# Patient Record
Sex: FEMALE | Race: WHITE | Employment: OTHER
[De-identification: names, ages, dates, MRNs, and addresses within clinical notes are randomized per-mention and may not be internally consistent; named-entity substitution may affect disease eponyms.]

---

## 2023-08-05 ENCOUNTER — HOSPITAL ENCOUNTER (EMERGENCY)
Facility: HOSPITAL | Age: 73
Discharge: HOME | DRG: 377 | End: 2023-08-06
Attending: EMERGENCY MEDICINE
Payer: MEDICARE

## 2023-08-05 DIAGNOSIS — I95.1 ORTHOSTATIC HYPOTENSION: ICD-10-CM

## 2023-08-05 DIAGNOSIS — R42 POSTURAL DIZZINESS WITH NEAR SYNCOPE: Primary | ICD-10-CM

## 2023-08-05 DIAGNOSIS — E86.0 DEHYDRATION: ICD-10-CM

## 2023-08-05 DIAGNOSIS — R55 POSTURAL DIZZINESS WITH NEAR SYNCOPE: Primary | ICD-10-CM

## 2023-08-05 LAB
ALBUMIN SERPL-MCNC: 3.2 G/DL (ref 3.5–5)
ALBUMIN/GLOB SERPL: 1.1 (ref 1.1–2.2)
ALP SERPL-CCNC: 40 U/L (ref 45–117)
ALT SERPL-CCNC: 15 U/L (ref 12–78)
ANION GAP SERPL CALC-SCNC: 9 MMOL/L (ref 5–15)
APPEARANCE UR: CLEAR
AST SERPL-CCNC: 11 U/L (ref 15–37)
BACTERIA URNS QL MICRO: NEGATIVE /HPF
BASOPHILS # BLD: 0 K/UL (ref 0–0.1)
BASOPHILS NFR BLD: 0 % (ref 0–1)
BILIRUB SERPL-MCNC: 0.3 MG/DL (ref 0.2–1)
BILIRUB UR QL: NEGATIVE
BUN SERPL-MCNC: 78 MG/DL (ref 6–20)
BUN/CREAT SERPL: 74 (ref 12–20)
CALCIUM SERPL-MCNC: 9.3 MG/DL (ref 8.5–10.1)
CHLORIDE SERPL-SCNC: 103 MMOL/L (ref 97–108)
CK SERPL-CCNC: 67 U/L (ref 26–192)
CO2 SERPL-SCNC: 27 MMOL/L (ref 21–32)
COLOR UR: ABNORMAL
CREAT SERPL-MCNC: 1.05 MG/DL (ref 0.55–1.02)
DIFFERENTIAL METHOD BLD: ABNORMAL
EOSINOPHIL # BLD: 0.1 K/UL (ref 0–0.4)
EOSINOPHIL NFR BLD: 1 % (ref 0–7)
EPITH CASTS URNS QL MICRO: ABNORMAL /LPF
ERYTHROCYTE [DISTWIDTH] IN BLOOD BY AUTOMATED COUNT: 12.9 % (ref 11.5–14.5)
GLOBULIN SER CALC-MCNC: 3 G/DL (ref 2–4)
GLUCOSE SERPL-MCNC: 130 MG/DL (ref 65–100)
GLUCOSE UR STRIP.AUTO-MCNC: NEGATIVE MG/DL
HCT VFR BLD AUTO: 31.2 % (ref 35–47)
HGB BLD-MCNC: 10.2 G/DL (ref 11.5–16)
HGB UR QL STRIP: ABNORMAL
IMM GRANULOCYTES # BLD AUTO: 0 K/UL (ref 0–0.04)
IMM GRANULOCYTES NFR BLD AUTO: 0 % (ref 0–0.5)
KETONES UR QL STRIP.AUTO: NEGATIVE MG/DL
LEUKOCYTE ESTERASE UR QL STRIP.AUTO: ABNORMAL
LYMPHOCYTES # BLD: 3.9 K/UL (ref 0.8–3.5)
LYMPHOCYTES NFR BLD: 42 % (ref 12–49)
MAGNESIUM SERPL-MCNC: 1.4 MG/DL (ref 1.6–2.4)
MCH RBC QN AUTO: 31.2 PG (ref 26–34)
MCHC RBC AUTO-ENTMCNC: 32.7 G/DL (ref 30–36.5)
MCV RBC AUTO: 95.4 FL (ref 80–99)
MONOCYTES # BLD: 0.5 K/UL (ref 0–1)
MONOCYTES NFR BLD: 5 % (ref 5–13)
NEUTS SEG # BLD: 4.7 K/UL (ref 1.8–8)
NEUTS SEG NFR BLD: 52 % (ref 32–75)
NITRITE UR QL STRIP.AUTO: NEGATIVE
NRBC # BLD: 0 K/UL (ref 0–0.01)
NRBC BLD-RTO: 0 PER 100 WBC
PH UR STRIP: 5 (ref 5–8)
PHOSPHATE SERPL-MCNC: 2.6 MG/DL (ref 2.6–4.7)
PLATELET # BLD AUTO: 340 K/UL (ref 150–400)
PMV BLD AUTO: 9.8 FL (ref 8.9–12.9)
POTASSIUM SERPL-SCNC: 4 MMOL/L (ref 3.5–5.1)
PROT SERPL-MCNC: 6.2 G/DL (ref 6.4–8.2)
PROT UR STRIP-MCNC: NEGATIVE MG/DL
RBC # BLD AUTO: 3.27 M/UL (ref 3.8–5.2)
RBC #/AREA URNS HPF: ABNORMAL /HPF (ref 0–5)
SODIUM SERPL-SCNC: 139 MMOL/L (ref 136–145)
SP GR UR REFRACTOMETRY: 1.01 (ref 1–1.03)
TROPONIN I SERPL HS-MCNC: 8 NG/L (ref 0–51)
TSH SERPL DL<=0.05 MIU/L-ACNC: 0.87 UIU/ML (ref 0.36–3.74)
URINE CULTURE IF INDICATED: ABNORMAL
UROBILINOGEN UR QL STRIP.AUTO: 0.2 EU/DL (ref 0.2–1)
WBC # BLD AUTO: 9.2 K/UL (ref 3.6–11)
WBC URNS QL MICRO: ABNORMAL /HPF (ref 0–4)

## 2023-08-05 PROCEDURE — 93005 ELECTROCARDIOGRAM TRACING: CPT | Performed by: EMERGENCY MEDICINE

## 2023-08-05 PROCEDURE — 84484 ASSAY OF TROPONIN QUANT: CPT

## 2023-08-05 PROCEDURE — 80053 COMPREHEN METABOLIC PANEL: CPT

## 2023-08-05 PROCEDURE — 2580000003 HC RX 258: Performed by: EMERGENCY MEDICINE

## 2023-08-05 PROCEDURE — 83735 ASSAY OF MAGNESIUM: CPT

## 2023-08-05 PROCEDURE — 84100 ASSAY OF PHOSPHORUS: CPT

## 2023-08-05 PROCEDURE — 6360000002 HC RX W HCPCS: Performed by: EMERGENCY MEDICINE

## 2023-08-05 PROCEDURE — 82550 ASSAY OF CK (CPK): CPT

## 2023-08-05 PROCEDURE — 81001 URINALYSIS AUTO W/SCOPE: CPT

## 2023-08-05 PROCEDURE — 36415 COLL VENOUS BLD VENIPUNCTURE: CPT

## 2023-08-05 PROCEDURE — 84443 ASSAY THYROID STIM HORMONE: CPT

## 2023-08-05 PROCEDURE — 85025 COMPLETE CBC W/AUTO DIFF WBC: CPT

## 2023-08-05 RX ORDER — DOXYCYCLINE HYCLATE 50 MG/1
324 CAPSULE, GELATIN COATED ORAL
Status: ON HOLD | COMMUNITY
End: 2023-08-12 | Stop reason: HOSPADM

## 2023-08-05 RX ORDER — ONDANSETRON 2 MG/ML
4 INJECTION INTRAMUSCULAR; INTRAVENOUS
Status: COMPLETED | OUTPATIENT
Start: 2023-08-05 | End: 2023-08-05

## 2023-08-05 RX ORDER — 0.9 % SODIUM CHLORIDE 0.9 %
1000 INTRAVENOUS SOLUTION INTRAVENOUS ONCE
Status: COMPLETED | OUTPATIENT
Start: 2023-08-05 | End: 2023-08-06

## 2023-08-05 RX ORDER — OLMESARTAN MEDOXOMIL AND HYDROCHLOROTHIAZIDE 20/12.5 20; 12.5 MG/1; MG/1
1 TABLET ORAL DAILY
COMMUNITY

## 2023-08-05 RX ORDER — 0.9 % SODIUM CHLORIDE 0.9 %
1000 INTRAVENOUS SOLUTION INTRAVENOUS ONCE
Status: COMPLETED | OUTPATIENT
Start: 2023-08-05 | End: 2023-08-05

## 2023-08-05 RX ADMIN — SODIUM CHLORIDE 1000 ML: 9 INJECTION, SOLUTION INTRAVENOUS at 22:27

## 2023-08-05 RX ADMIN — SODIUM CHLORIDE 1000 ML: 9 INJECTION, SOLUTION INTRAVENOUS at 20:08

## 2023-08-05 RX ADMIN — ONDANSETRON HYDROCHLORIDE 4 MG: 2 INJECTION, SOLUTION INTRAMUSCULAR; INTRAVENOUS at 20:52

## 2023-08-05 ASSESSMENT — PAIN - FUNCTIONAL ASSESSMENT: PAIN_FUNCTIONAL_ASSESSMENT: NONE - DENIES PAIN

## 2023-08-05 NOTE — ED TRIAGE NOTES
Pt complaining of dizziness after being in the sun yesterday and taking a laxative last night with good results.   Complaining of nausea without vomiting

## 2023-08-06 ENCOUNTER — HOSPITAL ENCOUNTER (INPATIENT)
Facility: HOSPITAL | Age: 73
LOS: 5 days | Discharge: HOME | DRG: 377 | End: 2023-08-12
Attending: STUDENT IN AN ORGANIZED HEALTH CARE EDUCATION/TRAINING PROGRAM | Admitting: STUDENT IN AN ORGANIZED HEALTH CARE EDUCATION/TRAINING PROGRAM
Payer: MEDICARE

## 2023-08-06 ENCOUNTER — APPOINTMENT (OUTPATIENT)
Facility: HOSPITAL | Age: 73
DRG: 377 | End: 2023-08-06
Payer: MEDICARE

## 2023-08-06 VITALS
BODY MASS INDEX: 18.24 KG/M2 | RESPIRATION RATE: 14 BRPM | SYSTOLIC BLOOD PRESSURE: 122 MMHG | HEART RATE: 79 BPM | DIASTOLIC BLOOD PRESSURE: 57 MMHG | TEMPERATURE: 98 F | OXYGEN SATURATION: 99 % | HEIGHT: 63 IN | WEIGHT: 102.95 LBS

## 2023-08-06 DIAGNOSIS — R55 SYNCOPE, UNSPECIFIED SYNCOPE TYPE: ICD-10-CM

## 2023-08-06 DIAGNOSIS — R42 DIZZINESS: ICD-10-CM

## 2023-08-06 DIAGNOSIS — N39.0 URINARY TRACT INFECTION WITHOUT HEMATURIA, SITE UNSPECIFIED: ICD-10-CM

## 2023-08-06 DIAGNOSIS — G93.89 BRAIN MASS: ICD-10-CM

## 2023-08-06 DIAGNOSIS — R53.83 OTHER FATIGUE: ICD-10-CM

## 2023-08-06 DIAGNOSIS — E83.42 HYPOMAGNESEMIA: ICD-10-CM

## 2023-08-06 DIAGNOSIS — D64.9 ANEMIA, UNSPECIFIED TYPE: Primary | ICD-10-CM

## 2023-08-06 LAB
ALBUMIN SERPL-MCNC: 2.9 G/DL (ref 3.5–5)
ALBUMIN/GLOB SERPL: 1.1 (ref 1.1–2.2)
ALP SERPL-CCNC: 36 U/L (ref 45–117)
ALT SERPL-CCNC: 18 U/L (ref 12–78)
ANION GAP SERPL CALC-SCNC: 5 MMOL/L (ref 5–15)
APPEARANCE UR: CLEAR
AST SERPL-CCNC: 13 U/L (ref 15–37)
BACTERIA URNS QL MICRO: NEGATIVE /HPF
BASOPHILS # BLD: 0 K/UL (ref 0–0.1)
BASOPHILS NFR BLD: 0 % (ref 0–1)
BILIRUB SERPL-MCNC: 0.2 MG/DL (ref 0.2–1)
BILIRUB UR QL: NEGATIVE
BUN SERPL-MCNC: 69 MG/DL (ref 6–20)
BUN/CREAT SERPL: 69 (ref 12–20)
CALCIUM SERPL-MCNC: 9 MG/DL (ref 8.5–10.1)
CHLORIDE SERPL-SCNC: 112 MMOL/L (ref 97–108)
CO2 SERPL-SCNC: 23 MMOL/L (ref 21–32)
COLOR UR: ABNORMAL
COMMENT:: NORMAL
CREAT SERPL-MCNC: 1 MG/DL (ref 0.55–1.02)
DIFFERENTIAL METHOD BLD: ABNORMAL
EKG ATRIAL RATE: 84 BPM
EKG DIAGNOSIS: NORMAL
EKG P AXIS: 64 DEGREES
EKG P-R INTERVAL: 170 MS
EKG Q-T INTERVAL: 402 MS
EKG QRS DURATION: 116 MS
EKG QTC CALCULATION (BAZETT): 475 MS
EKG R AXIS: 49 DEGREES
EKG T AXIS: 39 DEGREES
EKG VENTRICULAR RATE: 84 BPM
EOSINOPHIL # BLD: 0 K/UL (ref 0–0.4)
EOSINOPHIL NFR BLD: 0 % (ref 0–7)
EPITH CASTS URNS QL MICRO: ABNORMAL /LPF
ERYTHROCYTE [DISTWIDTH] IN BLOOD BY AUTOMATED COUNT: 13.1 % (ref 11.5–14.5)
GLOBULIN SER CALC-MCNC: 2.6 G/DL (ref 2–4)
GLUCOSE SERPL-MCNC: 109 MG/DL (ref 65–100)
GLUCOSE UR STRIP.AUTO-MCNC: NEGATIVE MG/DL
HCT VFR BLD AUTO: 22.5 % (ref 35–47)
HGB BLD-MCNC: 7.2 G/DL (ref 11.5–16)
HGB UR QL STRIP: NEGATIVE
HYALINE CASTS URNS QL MICRO: ABNORMAL /LPF (ref 0–5)
IMM GRANULOCYTES # BLD AUTO: 0 K/UL (ref 0–0.04)
IMM GRANULOCYTES NFR BLD AUTO: 0 % (ref 0–0.5)
KETONES UR QL STRIP.AUTO: NEGATIVE MG/DL
LEUKOCYTE ESTERASE UR QL STRIP.AUTO: ABNORMAL
LYMPHOCYTES # BLD: 2 K/UL (ref 0.8–3.5)
LYMPHOCYTES NFR BLD: 21 % (ref 12–49)
MAGNESIUM SERPL-MCNC: 1.5 MG/DL (ref 1.6–2.4)
MCH RBC QN AUTO: 31.7 PG (ref 26–34)
MCHC RBC AUTO-ENTMCNC: 32 G/DL (ref 30–36.5)
MCV RBC AUTO: 99.1 FL (ref 80–99)
MONOCYTES # BLD: 0.5 K/UL (ref 0–1)
MONOCYTES NFR BLD: 5 % (ref 5–13)
NEUTS SEG # BLD: 7.3 K/UL (ref 1.8–8)
NEUTS SEG NFR BLD: 74 % (ref 32–75)
NITRITE UR QL STRIP.AUTO: NEGATIVE
NRBC # BLD: 0 K/UL (ref 0–0.01)
NRBC BLD-RTO: 0 PER 100 WBC
PH UR STRIP: 5 (ref 5–8)
PLATELET # BLD AUTO: 270 K/UL (ref 150–400)
PMV BLD AUTO: 10.3 FL (ref 8.9–12.9)
POTASSIUM SERPL-SCNC: 4.2 MMOL/L (ref 3.5–5.1)
PROT SERPL-MCNC: 5.5 G/DL (ref 6.4–8.2)
PROT UR STRIP-MCNC: NEGATIVE MG/DL
RBC # BLD AUTO: 2.27 M/UL (ref 3.8–5.2)
RBC #/AREA URNS HPF: ABNORMAL /HPF (ref 0–5)
SODIUM SERPL-SCNC: 140 MMOL/L (ref 136–145)
SP GR UR REFRACTOMETRY: 1.01 (ref 1–1.03)
SPECIMEN HOLD: NORMAL
TROPONIN I SERPL HS-MCNC: 6 NG/L (ref 0–51)
TSH SERPL DL<=0.05 MIU/L-ACNC: 1.15 UIU/ML (ref 0.36–3.74)
UROBILINOGEN UR QL STRIP.AUTO: 0.2 EU/DL (ref 0.2–1)
WBC # BLD AUTO: 9.9 K/UL (ref 3.6–11)
WBC URNS QL MICRO: ABNORMAL /HPF (ref 0–4)

## 2023-08-06 PROCEDURE — 81001 URINALYSIS AUTO W/SCOPE: CPT

## 2023-08-06 PROCEDURE — 70450 CT HEAD/BRAIN W/O DYE: CPT

## 2023-08-06 PROCEDURE — 96361 HYDRATE IV INFUSION ADD-ON: CPT

## 2023-08-06 PROCEDURE — 6370000000 HC RX 637 (ALT 250 FOR IP): Performed by: STUDENT IN AN ORGANIZED HEALTH CARE EDUCATION/TRAINING PROGRAM

## 2023-08-06 PROCEDURE — 93005 ELECTROCARDIOGRAM TRACING: CPT | Performed by: HOSPITALIST

## 2023-08-06 PROCEDURE — 80053 COMPREHEN METABOLIC PANEL: CPT

## 2023-08-06 PROCEDURE — 84443 ASSAY THYROID STIM HORMONE: CPT

## 2023-08-06 PROCEDURE — 83735 ASSAY OF MAGNESIUM: CPT

## 2023-08-06 PROCEDURE — 85025 COMPLETE CBC W/AUTO DIFF WBC: CPT

## 2023-08-06 PROCEDURE — 36415 COLL VENOUS BLD VENIPUNCTURE: CPT

## 2023-08-06 PROCEDURE — 71045 X-RAY EXAM CHEST 1 VIEW: CPT

## 2023-08-06 PROCEDURE — 84484 ASSAY OF TROPONIN QUANT: CPT

## 2023-08-06 PROCEDURE — 2580000003 HC RX 258: Performed by: STUDENT IN AN ORGANIZED HEALTH CARE EDUCATION/TRAINING PROGRAM

## 2023-08-06 PROCEDURE — 99285 EMERGENCY DEPT VISIT HI MDM: CPT

## 2023-08-06 RX ORDER — 0.9 % SODIUM CHLORIDE 0.9 %
1000 INTRAVENOUS SOLUTION INTRAVENOUS ONCE
Status: COMPLETED | OUTPATIENT
Start: 2023-08-06 | End: 2023-08-07

## 2023-08-06 RX ORDER — ACETAMINOPHEN 500 MG
1000 TABLET ORAL
Status: COMPLETED | OUTPATIENT
Start: 2023-08-06 | End: 2023-08-06

## 2023-08-06 RX ADMIN — ACETAMINOPHEN 1000 MG: 500 TABLET ORAL at 23:44

## 2023-08-06 RX ADMIN — SODIUM CHLORIDE 1000 ML: 9 INJECTION, SOLUTION INTRAVENOUS at 22:09

## 2023-08-06 ASSESSMENT — LIFESTYLE VARIABLES
HOW OFTEN DO YOU HAVE A DRINK CONTAINING ALCOHOL: NEVER
HOW MANY STANDARD DRINKS CONTAINING ALCOHOL DO YOU HAVE ON A TYPICAL DAY: PATIENT DOES NOT DRINK

## 2023-08-06 ASSESSMENT — PAIN - FUNCTIONAL ASSESSMENT: PAIN_FUNCTIONAL_ASSESSMENT: NONE - DENIES PAIN

## 2023-08-06 ASSESSMENT — PAIN SCALES - GENERAL: PAINLEVEL_OUTOF10: 6

## 2023-08-06 NOTE — ED NOTES
Patient ambulated in hallway, stated felling better, advised MD that she would like to go home.      Delmis Bustamante RN  08/06/23 5841

## 2023-08-06 NOTE — ED PROVIDER NOTES
[facility]  EMERGENCY DEPARTMENT ENCOUNTER      Pt Name: Iliana Damon  MRN: [id number]  [address] 1950  Date of evaluation: 8/5/2023  Provider: Darling Marx MD    [address]       Chief Complaint   Patient presents with    Dizziness         HISTORY OF PRESENT ILLNESS   (Location/Symptom, Timing/Onset, Context/Setting, Quality, Duration, Modifying Factors, Severity)  Note limiting factors. 75-year-old female with a past medical history significant for diabetes, hypertension, hypercholesterolemia, chronic kidney disease who presents to the ER accompanied by family at the bedside with a complaint of dizziness, lightheadedness, feeling faint as if she is going to pass out accompanied by nausea. The patient states that she laid out in the sun yesterday for approximately 2 to 3 hours and then went home and symptoms began in the evening. The patient also allegedly took a laxative and has had several bouts of diarrhea. She tried to hydrate herself without any improvement of symptom and discomfort. She denies any fever chills, cough or congestion, chest pain, shortness of breath, vomiting, abdominal pain, extremity weakness or numbness, sick contact, skin rash or recent travel. The patient does state that she had a similar episode and [facility] where she had some fluid and felt better. Review of External Medical Records:     Nursing Notes were reviewed. REVIEW OF SYSTEMS    (2-9 systems for level 4, 10 or more for level 5)     Review of Systems   All other systems reviewed and are negative. Except as noted above the remainder of the review of systems was reviewed and negative. PAST MEDICAL HISTORY     Past Medical History:   Diagnosis Date    Chronic kidney disease     Diabetes ([address])     Hypertension          SURGICAL HISTORY     No past surgical history on file.       CURRENT MEDICATIONS       Discharge Medication List as of 8/6/2023 12:13

## 2023-08-06 NOTE — ED NOTES
PIV removed, slight infiltration appreciated,  D/C per orders, ambulated to car with assistance, family to drive patient home.      Delmis Bustamante RN  08/06/23 9049

## 2023-08-07 ENCOUNTER — APPOINTMENT (OUTPATIENT)
Facility: HOSPITAL | Age: 73
DRG: 377 | End: 2023-08-07
Payer: MEDICARE

## 2023-08-07 PROBLEM — D64.9 SYMPTOMATIC ANEMIA: Status: ACTIVE | Noted: 2023-08-07

## 2023-08-07 LAB
ALBUMIN SERPL-MCNC: 2.6 G/DL (ref 3.5–5)
ALBUMIN/GLOB SERPL: 1.3 (ref 1.1–2.2)
ALP SERPL-CCNC: 29 U/L (ref 45–117)
ALT SERPL-CCNC: 13 U/L (ref 12–78)
ANION GAP SERPL CALC-SCNC: 5 MMOL/L (ref 5–15)
ANION GAP SERPL CALC-SCNC: 6 MMOL/L (ref 5–15)
AST SERPL-CCNC: 13 U/L (ref 15–37)
BASOPHILS # BLD: 0 K/UL (ref 0–0.1)
BASOPHILS # BLD: 0 K/UL (ref 0–0.1)
BASOPHILS # BLD: 0.1 K/UL (ref 0–0.1)
BASOPHILS NFR BLD: 0 % (ref 0–1)
BASOPHILS NFR BLD: 0 % (ref 0–1)
BASOPHILS NFR BLD: 1 % (ref 0–1)
BILIRUB SERPL-MCNC: 0.4 MG/DL (ref 0.2–1)
BUN SERPL-MCNC: 60 MG/DL (ref 6–20)
BUN SERPL-MCNC: 61 MG/DL (ref 6–20)
BUN/CREAT SERPL: 70 (ref 12–20)
BUN/CREAT SERPL: 76 (ref 12–20)
CALCIUM SERPL-MCNC: 7.3 MG/DL (ref 8.5–10.1)
CALCIUM SERPL-MCNC: 7.9 MG/DL (ref 8.5–10.1)
CHLORIDE SERPL-SCNC: 113 MMOL/L (ref 97–108)
CHLORIDE SERPL-SCNC: 118 MMOL/L (ref 97–108)
CO2 SERPL-SCNC: 22 MMOL/L (ref 21–32)
CO2 SERPL-SCNC: 23 MMOL/L (ref 21–32)
COMMENT:: NORMAL
CREAT SERPL-MCNC: 0.8 MG/DL (ref 0.55–1.02)
CREAT SERPL-MCNC: 0.86 MG/DL (ref 0.55–1.02)
D DIMER PPP FEU-MCNC: <0.19 MG/L FEU (ref 0–0.65)
DIFFERENTIAL METHOD BLD: ABNORMAL
EKG ATRIAL RATE: 80 BPM
EKG DIAGNOSIS: NORMAL
EKG P AXIS: 85 DEGREES
EKG P-R INTERVAL: 174 MS
EKG Q-T INTERVAL: 416 MS
EKG QRS DURATION: 122 MS
EKG QTC CALCULATION (BAZETT): 479 MS
EKG R AXIS: 38 DEGREES
EKG T AXIS: 62 DEGREES
EKG VENTRICULAR RATE: 80 BPM
EOSINOPHIL # BLD: 0 K/UL (ref 0–0.4)
EOSINOPHIL # BLD: 0 K/UL (ref 0–0.4)
EOSINOPHIL # BLD: 0.1 K/UL (ref 0–0.4)
EOSINOPHIL NFR BLD: 0 % (ref 0–7)
EOSINOPHIL NFR BLD: 0 % (ref 0–7)
EOSINOPHIL NFR BLD: 1 % (ref 0–7)
ERYTHROCYTE [DISTWIDTH] IN BLOOD BY AUTOMATED COUNT: 12.9 % (ref 11.5–14.5)
ERYTHROCYTE [DISTWIDTH] IN BLOOD BY AUTOMATED COUNT: 14.6 % (ref 11.5–14.5)
ERYTHROCYTE [DISTWIDTH] IN BLOOD BY AUTOMATED COUNT: 15 % (ref 11.5–14.5)
EST. AVERAGE GLUCOSE BLD GHB EST-MCNC: ABNORMAL MG/DL
FERRITIN SERPL-MCNC: 27 NG/ML (ref 8–252)
FOLATE SERPL-MCNC: 18.9 NG/ML (ref 5–21)
GLOBULIN SER CALC-MCNC: 2 G/DL (ref 2–4)
GLUCOSE BLD STRIP.AUTO-MCNC: 121 MG/DL (ref 65–117)
GLUCOSE BLD STRIP.AUTO-MCNC: 121 MG/DL (ref 65–117)
GLUCOSE BLD STRIP.AUTO-MCNC: 133 MG/DL (ref 65–117)
GLUCOSE BLD STRIP.AUTO-MCNC: 237 MG/DL (ref 65–117)
GLUCOSE BLD STRIP.AUTO-MCNC: 95 MG/DL (ref 65–117)
GLUCOSE SERPL-MCNC: 123 MG/DL (ref 65–100)
GLUCOSE SERPL-MCNC: 208 MG/DL (ref 65–100)
HAPTOGLOB SERPL-MCNC: 119 MG/DL (ref 30–200)
HBA1C MFR BLD: <3.8 % (ref 4–5.6)
HCT VFR BLD AUTO: 18.1 % (ref 35–47)
HCT VFR BLD AUTO: 18.7 % (ref 35–47)
HCT VFR BLD AUTO: 26.5 % (ref 35–47)
HEMOCCULT STL QL: POSITIVE
HGB BLD-MCNC: 5.6 G/DL (ref 11.5–16)
HGB BLD-MCNC: 5.9 G/DL (ref 11.5–16)
HGB BLD-MCNC: 8.4 G/DL (ref 11.5–16)
HISTORY CHECK: NORMAL
HISTORY CHECK: NORMAL
IMM GRANULOCYTES # BLD AUTO: 0 K/UL (ref 0–0.04)
IMM GRANULOCYTES # BLD AUTO: 0.1 K/UL (ref 0–0.04)
IMM GRANULOCYTES # BLD AUTO: 0.1 K/UL (ref 0–0.04)
IMM GRANULOCYTES NFR BLD AUTO: 0 % (ref 0–0.5)
IMM GRANULOCYTES NFR BLD AUTO: 1 % (ref 0–0.5)
IMM GRANULOCYTES NFR BLD AUTO: 1 % (ref 0–0.5)
IRON SATN MFR SERPL: 37 % (ref 20–50)
IRON SERPL-MCNC: 91 UG/DL (ref 35–150)
LDH SERPL L TO P-CCNC: 92 U/L (ref 81–246)
LYMPHOCYTES # BLD: 1.8 K/UL (ref 0.8–3.5)
LYMPHOCYTES # BLD: 2.2 K/UL (ref 0.8–3.5)
LYMPHOCYTES # BLD: 3.2 K/UL (ref 0.8–3.5)
LYMPHOCYTES NFR BLD: 14 % (ref 12–49)
LYMPHOCYTES NFR BLD: 25 % (ref 12–49)
LYMPHOCYTES NFR BLD: 33 % (ref 12–49)
MCH RBC QN AUTO: 30.4 PG (ref 26–34)
MCH RBC QN AUTO: 30.4 PG (ref 26–34)
MCH RBC QN AUTO: 31.1 PG (ref 26–34)
MCHC RBC AUTO-ENTMCNC: 30.9 G/DL (ref 30–36.5)
MCHC RBC AUTO-ENTMCNC: 31.6 G/DL (ref 30–36.5)
MCHC RBC AUTO-ENTMCNC: 31.7 G/DL (ref 30–36.5)
MCV RBC AUTO: 100.6 FL (ref 80–99)
MCV RBC AUTO: 96 FL (ref 80–99)
MCV RBC AUTO: 96.4 FL (ref 80–99)
MONOCYTES # BLD: 0.5 K/UL (ref 0–1)
MONOCYTES # BLD: 0.5 K/UL (ref 0–1)
MONOCYTES # BLD: 0.6 K/UL (ref 0–1)
MONOCYTES NFR BLD: 5 % (ref 5–13)
MONOCYTES NFR BLD: 5 % (ref 5–13)
MONOCYTES NFR BLD: 6 % (ref 5–13)
NEUTS SEG # BLD: 5.7 K/UL (ref 1.8–8)
NEUTS SEG # BLD: 5.8 K/UL (ref 1.8–8)
NEUTS SEG # BLD: 9.8 K/UL (ref 1.8–8)
NEUTS SEG NFR BLD: 60 % (ref 32–75)
NEUTS SEG NFR BLD: 68 % (ref 32–75)
NEUTS SEG NFR BLD: 80 % (ref 32–75)
NRBC # BLD: 0 K/UL (ref 0–0.01)
NRBC BLD-RTO: 0 PER 100 WBC
PERIPHERAL SMEAR, MD REVIEW: NORMAL
PLATELET # BLD AUTO: 168 K/UL (ref 150–400)
PLATELET # BLD AUTO: 191 K/UL (ref 150–400)
PLATELET # BLD AUTO: 220 K/UL (ref 150–400)
PMV BLD AUTO: 10.2 FL (ref 8.9–12.9)
PMV BLD AUTO: 10.3 FL (ref 8.9–12.9)
PMV BLD AUTO: 10.6 FL (ref 8.9–12.9)
POTASSIUM SERPL-SCNC: 4.1 MMOL/L (ref 3.5–5.1)
POTASSIUM SERPL-SCNC: 4.4 MMOL/L (ref 3.5–5.1)
PROT SERPL-MCNC: 4.6 G/DL (ref 6.4–8.2)
RBC # BLD AUTO: 1.8 M/UL (ref 3.8–5.2)
RBC # BLD AUTO: 1.94 M/UL (ref 3.8–5.2)
RBC # BLD AUTO: 2.76 M/UL (ref 3.8–5.2)
RBC MORPH BLD: ABNORMAL
RBC MORPH BLD: ABNORMAL
RETICS # AUTO: 0.05 M/UL (ref 0.02–0.08)
RETICS/RBC NFR AUTO: 2.8 % (ref 0.7–2.1)
SERVICE CMNT-IMP: ABNORMAL
SERVICE CMNT-IMP: NORMAL
SODIUM SERPL-SCNC: 142 MMOL/L (ref 136–145)
SODIUM SERPL-SCNC: 145 MMOL/L (ref 136–145)
SPECIMEN HOLD: NORMAL
TIBC SERPL-MCNC: 243 UG/DL (ref 250–450)
TROPONIN I SERPL HS-MCNC: 7 NG/L (ref 0–51)
TROPONIN I SERPL HS-MCNC: 8 NG/L (ref 0–51)
VIT B12 SERPL-MCNC: 1717 PG/ML (ref 193–986)
WBC # BLD AUTO: 12.3 K/UL (ref 3.6–11)
WBC # BLD AUTO: 8.6 K/UL (ref 3.6–11)
WBC # BLD AUTO: 9.6 K/UL (ref 3.6–11)

## 2023-08-07 PROCEDURE — 85045 AUTOMATED RETICULOCYTE COUNT: CPT

## 2023-08-07 PROCEDURE — 96365 THER/PROPH/DIAG IV INF INIT: CPT

## 2023-08-07 PROCEDURE — 82272 OCCULT BLD FECES 1-3 TESTS: CPT

## 2023-08-07 PROCEDURE — 80053 COMPREHEN METABOLIC PANEL: CPT

## 2023-08-07 PROCEDURE — 2580000003 HC RX 258

## 2023-08-07 PROCEDURE — 94760 N-INVAS EAR/PLS OXIMETRY 1: CPT

## 2023-08-07 PROCEDURE — 97110 THERAPEUTIC EXERCISES: CPT

## 2023-08-07 PROCEDURE — A4216 STERILE WATER/SALINE, 10 ML: HCPCS

## 2023-08-07 PROCEDURE — 6370000000 HC RX 637 (ALT 250 FOR IP): Performed by: STUDENT IN AN ORGANIZED HEALTH CARE EDUCATION/TRAINING PROGRAM

## 2023-08-07 PROCEDURE — 2060000000 HC ICU INTERMEDIATE R&B

## 2023-08-07 PROCEDURE — 85025 COMPLETE CBC W/AUTO DIFF WBC: CPT

## 2023-08-07 PROCEDURE — 6360000004 HC RX CONTRAST MEDICATION: Performed by: STUDENT IN AN ORGANIZED HEALTH CARE EDUCATION/TRAINING PROGRAM

## 2023-08-07 PROCEDURE — 83615 LACTATE (LD) (LDH) ENZYME: CPT

## 2023-08-07 PROCEDURE — 83010 ASSAY OF HAPTOGLOBIN QUANT: CPT

## 2023-08-07 PROCEDURE — 86850 RBC ANTIBODY SCREEN: CPT

## 2023-08-07 PROCEDURE — 86900 BLOOD TYPING SEROLOGIC ABO: CPT

## 2023-08-07 PROCEDURE — 82607 VITAMIN B-12: CPT

## 2023-08-07 PROCEDURE — 6360000002 HC RX W HCPCS

## 2023-08-07 PROCEDURE — 83550 IRON BINDING TEST: CPT

## 2023-08-07 PROCEDURE — 36415 COLL VENOUS BLD VENIPUNCTURE: CPT

## 2023-08-07 PROCEDURE — 30233N1 TRANSFUSION OF NONAUTOLOGOUS RED BLOOD CELLS INTO PERIPHERAL VEIN, PERCUTANEOUS APPROACH: ICD-10-PCS | Performed by: INTERNAL MEDICINE

## 2023-08-07 PROCEDURE — 93010 ELECTROCARDIOGRAM REPORT: CPT | Performed by: SPECIALIST

## 2023-08-07 PROCEDURE — 6370000000 HC RX 637 (ALT 250 FOR IP): Performed by: HOSPITALIST

## 2023-08-07 PROCEDURE — 2580000003 HC RX 258: Performed by: HOSPITALIST

## 2023-08-07 PROCEDURE — 82746 ASSAY OF FOLIC ACID SERUM: CPT

## 2023-08-07 PROCEDURE — 97165 OT EVAL LOW COMPLEX 30 MIN: CPT

## 2023-08-07 PROCEDURE — 84484 ASSAY OF TROPONIN QUANT: CPT

## 2023-08-07 PROCEDURE — 86901 BLOOD TYPING SEROLOGIC RH(D): CPT

## 2023-08-07 PROCEDURE — 97161 PT EVAL LOW COMPLEX 20 MIN: CPT

## 2023-08-07 PROCEDURE — 85379 FIBRIN DEGRADATION QUANT: CPT

## 2023-08-07 PROCEDURE — 6360000002 HC RX W HCPCS: Performed by: STUDENT IN AN ORGANIZED HEALTH CARE EDUCATION/TRAINING PROGRAM

## 2023-08-07 PROCEDURE — P9016 RBC LEUKOCYTES REDUCED: HCPCS

## 2023-08-07 PROCEDURE — 82728 ASSAY OF FERRITIN: CPT

## 2023-08-07 PROCEDURE — 82962 GLUCOSE BLOOD TEST: CPT

## 2023-08-07 PROCEDURE — 83036 HEMOGLOBIN GLYCOSYLATED A1C: CPT

## 2023-08-07 PROCEDURE — 86923 COMPATIBILITY TEST ELECTRIC: CPT

## 2023-08-07 PROCEDURE — 2580000003 HC RX 258: Performed by: STUDENT IN AN ORGANIZED HEALTH CARE EDUCATION/TRAINING PROGRAM

## 2023-08-07 PROCEDURE — 97530 THERAPEUTIC ACTIVITIES: CPT

## 2023-08-07 PROCEDURE — 97535 SELF CARE MNGMENT TRAINING: CPT

## 2023-08-07 PROCEDURE — 74178 CT ABD&PLV WO CNTR FLWD CNTR: CPT

## 2023-08-07 PROCEDURE — 83540 ASSAY OF IRON: CPT

## 2023-08-07 PROCEDURE — C9113 INJ PANTOPRAZOLE SODIUM, VIA: HCPCS

## 2023-08-07 RX ORDER — SODIUM CHLORIDE 0.9 % (FLUSH) 0.9 %
5-40 SYRINGE (ML) INJECTION PRN
Status: DISCONTINUED | OUTPATIENT
Start: 2023-08-07 | End: 2023-08-12 | Stop reason: HOSPADM

## 2023-08-07 RX ORDER — ACETAMINOPHEN 650 MG/1
650 SUPPOSITORY RECTAL EVERY 6 HOURS PRN
Status: DISCONTINUED | OUTPATIENT
Start: 2023-08-07 | End: 2023-08-12 | Stop reason: HOSPADM

## 2023-08-07 RX ORDER — SODIUM CHLORIDE 9 MG/ML
INJECTION, SOLUTION INTRAVENOUS PRN
Status: DISCONTINUED | OUTPATIENT
Start: 2023-08-07 | End: 2023-08-10 | Stop reason: ALTCHOICE

## 2023-08-07 RX ORDER — POLYETHYLENE GLYCOL 3350 17 G/17G
17 POWDER, FOR SOLUTION ORAL DAILY PRN
Status: DISCONTINUED | OUTPATIENT
Start: 2023-08-07 | End: 2023-08-12 | Stop reason: HOSPADM

## 2023-08-07 RX ORDER — INSULIN LISPRO 100 [IU]/ML
0-4 INJECTION, SOLUTION INTRAVENOUS; SUBCUTANEOUS
Status: DISCONTINUED | OUTPATIENT
Start: 2023-08-07 | End: 2023-08-12 | Stop reason: HOSPADM

## 2023-08-07 RX ORDER — 0.9 % SODIUM CHLORIDE 0.9 %
1000 INTRAVENOUS SOLUTION INTRAVENOUS ONCE
Status: COMPLETED | OUTPATIENT
Start: 2023-08-07 | End: 2023-08-07

## 2023-08-07 RX ORDER — SODIUM CHLORIDE 9 MG/ML
INJECTION, SOLUTION INTRAVENOUS CONTINUOUS
Status: DISCONTINUED | OUTPATIENT
Start: 2023-08-07 | End: 2023-08-07

## 2023-08-07 RX ORDER — ONDANSETRON 4 MG/1
4 TABLET, ORALLY DISINTEGRATING ORAL EVERY 8 HOURS PRN
Status: DISCONTINUED | OUTPATIENT
Start: 2023-08-07 | End: 2023-08-12 | Stop reason: HOSPADM

## 2023-08-07 RX ORDER — SODIUM CHLORIDE 9 MG/ML
INJECTION, SOLUTION INTRAVENOUS PRN
Status: DISCONTINUED | OUTPATIENT
Start: 2023-08-07 | End: 2023-08-12 | Stop reason: HOSPADM

## 2023-08-07 RX ORDER — SODIUM CHLORIDE 9 MG/ML
INJECTION, SOLUTION INTRAVENOUS CONTINUOUS
Status: DISCONTINUED | OUTPATIENT
Start: 2023-08-07 | End: 2023-08-08

## 2023-08-07 RX ORDER — LIDOCAINE 40 MG/G
CREAM TOPICAL PRN
Status: DISCONTINUED | OUTPATIENT
Start: 2023-08-07 | End: 2023-08-12 | Stop reason: HOSPADM

## 2023-08-07 RX ORDER — SODIUM CHLORIDE 0.9 % (FLUSH) 0.9 %
5-40 SYRINGE (ML) INJECTION EVERY 12 HOURS SCHEDULED
Status: DISCONTINUED | OUTPATIENT
Start: 2023-08-07 | End: 2023-08-12 | Stop reason: HOSPADM

## 2023-08-07 RX ORDER — ACETAMINOPHEN 325 MG/1
650 TABLET ORAL EVERY 6 HOURS PRN
Status: DISCONTINUED | OUTPATIENT
Start: 2023-08-07 | End: 2023-08-12 | Stop reason: HOSPADM

## 2023-08-07 RX ORDER — CALCIUM CARBONATE 500 MG/1
1 TABLET, CHEWABLE ORAL
Status: COMPLETED | OUTPATIENT
Start: 2023-08-07 | End: 2023-08-07

## 2023-08-07 RX ORDER — NITROFURANTOIN 25; 75 MG/1; MG/1
100 CAPSULE ORAL EVERY 12 HOURS SCHEDULED
Status: DISCONTINUED | OUTPATIENT
Start: 2023-08-07 | End: 2023-08-07

## 2023-08-07 RX ORDER — DEXTROSE MONOHYDRATE 100 MG/ML
INJECTION, SOLUTION INTRAVENOUS CONTINUOUS PRN
Status: DISCONTINUED | OUTPATIENT
Start: 2023-08-07 | End: 2023-08-12 | Stop reason: HOSPADM

## 2023-08-07 RX ORDER — ONDANSETRON 2 MG/ML
4 INJECTION INTRAMUSCULAR; INTRAVENOUS EVERY 6 HOURS PRN
Status: DISCONTINUED | OUTPATIENT
Start: 2023-08-07 | End: 2023-08-12 | Stop reason: HOSPADM

## 2023-08-07 RX ORDER — INSULIN LISPRO 100 [IU]/ML
0-4 INJECTION, SOLUTION INTRAVENOUS; SUBCUTANEOUS NIGHTLY
Status: DISCONTINUED | OUTPATIENT
Start: 2023-08-07 | End: 2023-08-12 | Stop reason: HOSPADM

## 2023-08-07 RX ORDER — MAGNESIUM SULFATE 1 G/100ML
1000 INJECTION INTRAVENOUS
Status: COMPLETED | OUTPATIENT
Start: 2023-08-07 | End: 2023-08-07

## 2023-08-07 RX ORDER — CALCIUM CARBONATE 500(1250)
500 TABLET ORAL EVERY EVENING
Status: DISCONTINUED | OUTPATIENT
Start: 2023-08-07 | End: 2023-08-12 | Stop reason: HOSPADM

## 2023-08-07 RX ADMIN — SODIUM CHLORIDE: 9 INJECTION, SOLUTION INTRAVENOUS at 10:08

## 2023-08-07 RX ADMIN — IOPAMIDOL 100 ML: 755 INJECTION, SOLUTION INTRAVENOUS at 01:18

## 2023-08-07 RX ADMIN — ACETAMINOPHEN 650 MG: 325 TABLET ORAL at 20:13

## 2023-08-07 RX ADMIN — MAGNESIUM SULFATE HEPTAHYDRATE 1000 MG: 1 INJECTION, SOLUTION INTRAVENOUS at 01:03

## 2023-08-07 RX ADMIN — SODIUM CHLORIDE: 9 INJECTION, SOLUTION INTRAVENOUS at 18:29

## 2023-08-07 RX ADMIN — ONDANSETRON 4 MG: 2 INJECTION INTRAMUSCULAR; INTRAVENOUS at 15:10

## 2023-08-07 RX ADMIN — SODIUM CHLORIDE 40 MG: 9 INJECTION INTRAMUSCULAR; INTRAVENOUS; SUBCUTANEOUS at 22:28

## 2023-08-07 RX ADMIN — SODIUM CHLORIDE, PRESERVATIVE FREE 10 ML: 5 INJECTION INTRAVENOUS at 09:19

## 2023-08-07 RX ADMIN — CALCIUM CARBONATE 500 MG: 500 TABLET, CHEWABLE ORAL at 11:13

## 2023-08-07 RX ADMIN — SODIUM CHLORIDE 1000 ML: 9 INJECTION, SOLUTION INTRAVENOUS at 16:20

## 2023-08-07 RX ADMIN — SODIUM CHLORIDE, PRESERVATIVE FREE 10 ML: 5 INJECTION INTRAVENOUS at 20:02

## 2023-08-07 ASSESSMENT — ENCOUNTER SYMPTOMS
DIARRHEA: 0
NAUSEA: 0
SHORTNESS OF BREATH: 0
VOMITING: 0
ABDOMINAL PAIN: 1

## 2023-08-07 ASSESSMENT — PAIN SCALES - GENERAL
PAINLEVEL_OUTOF10: 6
PAINLEVEL_OUTOF10: 0
PAINLEVEL_OUTOF10: 0

## 2023-08-07 ASSESSMENT — PAIN DESCRIPTION - LOCATION: LOCATION: LEG

## 2023-08-07 NOTE — PROGRESS NOTES
Occupational Therapy  08/07/23    OT order received and acknowledged. Chart reviewed, pt HGB is currently 5.6 with plans to transfuse. Will hold for now follow up as medically appropriate.       Thank you   Octavia Harris OTD, OTR/L

## 2023-08-07 NOTE — PLAN OF CARE
Problem: Discharge Planning  Goal: Discharge to home or other facility with appropriate resources  Outcome: Progressing  Flowsheets (Taken 8/7/2023 3549)  Discharge to home or other facility with appropriate resources: Identify barriers to discharge with patient and caregiver     Problem: Pain  Goal: Verbalizes/displays adequate comfort level or baseline comfort level  Outcome: Progressing     Problem: Skin/Tissue Integrity  Goal: Absence of new skin breakdown  Description: 1. Monitor for areas of redness and/or skin breakdown  2. Assess vascular access sites hourly  3. Every 4-6 hours minimum:  Change oxygen saturation probe site  4. Every 4-6 hours:  If on nasal continuous positive airway pressure, respiratory therapy assess nares and determine need for appliance change or resting period.   Outcome: Progressing     Problem: Safety - Adult  Goal: Free from fall injury  Outcome: Progressing

## 2023-08-07 NOTE — PLAN OF CARE
Problem: Discharge Planning  Goal: Discharge to home or other facility with appropriate resources  8/7/2023 0952 by Leonardo Cordova RN  Outcome: Progressing  8/7/2023 0518 by Terra Matos RN  Outcome: Progressing  Flowsheets (Taken 8/7/2023 0345)  Discharge to home or other facility with appropriate resources: Identify barriers to discharge with patient and caregiver     Problem: Pain  Goal: Verbalizes/displays adequate comfort level or baseline comfort level  8/7/2023 0952 by Leonardo Cordova RN  Outcome: Progressing  8/7/2023 0518 by Terra Matos RN  Outcome: Progressing     Problem: Skin/Tissue Integrity  Goal: Absence of new skin breakdown  Description: 1. Monitor for areas of redness and/or skin breakdown  2. Assess vascular access sites hourly  3. Every 4-6 hours minimum:  Change oxygen saturation probe site  4. Every 4-6 hours:  If on nasal continuous positive airway pressure, respiratory therapy assess nares and determine need for appliance change or resting period.   8/7/2023 3053 by Leonardo Cordova RN  Outcome: Progressing  8/7/2023 0518 by Terra Matos RN  Outcome: Progressing     Problem: Safety - Adult  Goal: Free from fall injury  8/7/2023 0952 by Leonardo Cordova RN  Outcome: Progressing  8/7/2023 0518 by Terra Matos RN  Outcome: Progressing     Problem: Chronic Conditions and Co-morbidities  Goal: Patient's chronic conditions and co-morbidity symptoms are monitored and maintained or improved  Outcome: Progressing

## 2023-08-07 NOTE — H&P
History & Physical    Primary Care Provider: [facility]  Source of Information: Patient and chart review    History of Presenting Illness:   Edwin Valdes is a 74 y.o. female with history of hypertension, diabetes, iron deficiency anemia, CKD who presented to ED with multiple complaints/concerns. Patient states she had been in her usual state of health until about 4 days ago when she noted increasing mild malaise, fatigue, restlessness, lightheadedness and dizziness especially with positional changes. She has had no falls or head injury. She does endorse some nausea with nonbloody nonbilious emesis associated with episodes of lightheadedness. She denies any melena or hematochezia. No recent travel or sick contacts. The patient denies any fever, chills, chest or abdominal pain, cough, congestion, recent illness, palpitations, or dysuria. Remarkable vitals on ER Presentation: Vital signs stable  Labs Remarkable for: Hemoglobin 7.2, hematocrit 22.5, mag 1.5  ER Images: CT head: Calcified mass along frontal aspect of the falx concerning for meningioma. ER Rx: Tylenol 1 g, mag 1 g Macrobid, 1 L normal saline bolus     Review of Systems:  Pertinent items are noted in the History of Present Illness. Past Medical History:   Diagnosis Date    Chronic kidney disease     Diabetes ([address])     Hypertension       No past surgical history on file. Prior to Admission medications    Medication Sig Start Date End Date Taking? Authorizing Provider   metFORMIN (GLUCOPHAGE) 1000 MG tablet Take 1 tablet by mouth 2 times daily (with meals)    Historical Provider, MD   olmesartan-hydroCHLOROthiazide (BENICAR HCT) 20-12.5 MG per tablet Take 1 tablet by mouth daily    Historical Provider, MD   ferrous gluconate (FERGON) 324 (38 Fe) MG tablet Take 1 tablet by mouth daily (with breakfast)    Historical Provider, MD     Allergies   Allergen Reactions    Penicillins     Doxycycline Rash      No family history on file.

## 2023-08-07 NOTE — PROGRESS NOTES
[address] [facility]  Hospitalist Group                                                                                          Hospitalist Progress Note  Jolanta Bravo MD  Office Phone: [phone]        Date of Service:  2023  NAME:  Latonia Corbett  :  1950  MRN:  [id number]       Admission Summary:   Latonia Corbett is a 74 y.o. female with history of hypertension, diabetes, iron deficiency anemia, CKD who is admitted for symptomatic anemia. Interval history / Subjective:   Feels ok, denies pain, bleeding, n/v/d, dyspnea. Addendum: rapid response called in the afternoon for syncope. Pt seen and examined, was on on the toilet and she slid off and was unresponsive. Initially hypotensive during the episode and unresponsive, BP quickly recovered, never lost a pulse, slowly became more responsive, had a bm after sliding off toilet. RN notes brown stool but reportedly noticed a blood clot in there. Tele reviewed, no arrhythmias appreciated, ECG w/ RBB present on admit, no ischemic changes. Bolus 1L IVNS. Check trop, BMP, lactic acid, d-dimer. Updated daughter Haseeb Gutierrez over the phone. 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 Type 2 DM w/ hyperglycemia:  -SSI/POC checks  -hold metformin    HTN:  -meds held due to borderline hypotension    CKD II-III per the patient, monitor. Also endorses weight loss which she blames on a \"kidney diet,\" states a doctor told her to avoid protein, dairy, and to mainly eat vegetables.  Will ask nutrition to advise her while here, older/frail patient should not be so heavily protein

## 2023-08-07 NOTE — CONSULTS
HEMATOLOGY / ONCOLOGY    Roger Hathaway 1950 Age: 74 y.o. Date of service: 01/23/20   Location: [address], [place]        ACTIVE PROBLEMS  Weakness/fatigue/nausea  --Sudden loss of consciousness, of unknown etiology, 8/7/23  --Positional dizziness? Anemia of unknown etiology  Type II DM  Incidental frontal meningioma  HTN          INTERIM HISTORY AND ASSESSMENT  I was asked to see Ms. Martina Mcmullen for anemia. When I came to see her, she was complaining of dizziness and nausea and apparently she has a history of dizziness when she leans her head back. She sitting on the toilet when I came into her room, but she was not trying to move her bowels. She tried to stand and get to her bed, but she fell too weak and sat down on the toilet again. We did not get a chance to do orthostatics on her. She slumped back on the toilet and was not responding, so we moved her to the floor. Her initial BP was low, about 60 systolic, so we started NS wide open. She never lost her femoral pulse, nor her audible heart sounds, but she did stop breathing for a bit. Once we got oxygen on her and bagged her briefly, she started moving and swallowing and breathing on her own. She lost control of bowels during this time. The stool is brown and liquid with no sign of blood and no appearance or smell of melena. After a few minutes, her systolic BP  came up to 617 and her pulse ox was 100. He then woke up and moved all of her extremities. Initial rhythm strip appeared to be sinus. The hospitalist interpreted the 12-lead. There was never any tonic-clonic activity. All of this appears to have nothing to do with her anemia. She had hgb of 10.2 on 8/5., then 7/2 yesterday and 5.6 today. She had two units of blood and follow-up hgb is 8.4. retics were inappropriately low at 2.8%.  She has hx of CKD, but creatinine has fallen since she's been in hospital. Unless she has UGI bleeding that has not made its way through her bowel yet, or

## 2023-08-07 NOTE — PROGRESS NOTES
1930 Bedside shift change report given to Brittany Vivas RN (oncoming nurse) by Aly Smith RN (offgoing nurse). Report included the following information Nurse Handoff Report, Intake/Output, MAR, Recent Results, and Cardiac Rhythm NSR .     2200 Blood transfusion started    0023 Blood transfusion complete     0045 MRI checklist complete, signed & in pt's chart     0145 Labs drawn     0600 CHG bath. Gown & linens changed     0730 Bedside shift change report given to Aly Smith RN (oncoming nurse) by Brittany Vivas RN (offgoing nurse). Report included the following information Nurse Handoff Report, Intake/Output, MAR, Recent Results, and Cardiac Rhythm NSR.

## 2023-08-07 NOTE — PLAN OF CARE
Problem: Physical Therapy - Adult  Goal: By Discharge: Performs mobility at highest level of function for planned discharge setting. See evaluation for individualized goals. Description: FUNCTIONAL STATUS PRIOR TO ADMISSION: Patient was independent and active without use of DME.    HOME SUPPORT PRIOR TO ADMISSION: The patient lived alone with daughter living close to provide assistance. Physical Therapy Goals  Initiated 8/7/2023  1. Patient will move from supine to sit and sit to supine, scoot up and down, and roll side to side in bed with independence within 7 day(s). 2.  Patient will perform sit to stand with modified independence within 7 day(s). 3.  Patient will transfer from bed to chair and chair to bed with modified independence using the least restrictive device within 7 day(s). 4.  Patient will ambulate with modified independence for 200 feet with the least restrictive device within 7 day(s). 5.  Patient will ascend/descend 4 stairs with unilateral handrail(s) and LRAD with modified independence within 7 day(s). 8/7/2023 1605 by Bob Perez, PT  Outcome: Progressing     PHYSICAL THERAPY EVALUATION    Patient: Katelynn Killian (74 y.o. female)  Date: 8/7/2023  Primary Diagnosis: Hypomagnesemia [E83.42]  Dizziness [R42]  Brain mass [G93.89]  Symptomatic anemia [D64.9]  Urinary tract infection without hematuria, site unspecified [N39.0]  Other fatigue [R53.83]  Anemia, unspecified type [D64.9]       Precautions: Fall Risk, Other (comment) (bed/chair alarm)                    ASSESSMENT :   DEFICITS/IMPAIRMENTS:   The patient is limited by decreased functional mobility, ROM, strength, body mechanics, activity tolerance, endurance, safety awareness, balance     Based on the impairments listed above pt is appropriate for continued acute PT services to work towards a more safe and independent discharge.  Pt PLOF is independent without DME, however pt is requiring CGA-Jorge A for walking without

## 2023-08-07 NOTE — ED PROVIDER NOTES
[facility] EMERGENCY DEP  EMERGENCY DEPARTMENT ENCOUNTER      Pt Name: Brandee Tovar  MRN: [id number]  [address] [facility] 1950  Date of evaluation: 8/6/2023  Provider: Arely Del Cid       Chief Complaint   Patient presents with    Dizziness     Arrives by ems for dizziness upon standing. Was seen by urgent care center last night and told Demetra nima has a little bit of Afib\". Reports was out in the heat on Friday and has not felt well since. Reports was hydrating but still is weak and fatigued. Recvd fluids at Urgent Care center last night but reports still weak today with hypotensive readings at home. Denies chest pain, abd pain, nvd. HISTORY OF PRESENT ILLNESS   (Location/Symptom, Timing/Onset, Context/Setting, Quality, Duration, Modifying Factors, Severity)  Note limiting factors. This is a 64-year-old male who presents ED for dizziness lightheadedness and fatigue for last 3 days. Patient ports that she is healthy confrontive and has not lifting. She feels lightheaded and has difficulty standing up. Patient went to urgent care last evening because she felt weak and was reported to have some hypotension at home. Denies any focal weakness chest pain shortness of breath. Denies abdominal pain hematuria or diarrhea. Review of External Medical Records:     Nursing Notes were reviewed. REVIEW OF SYSTEMS    (2-9 systems for level 4, 10 or more for level 5)     Review of Systems   Constitutional:  Positive for fatigue. Respiratory:  Negative for shortness of breath. Cardiovascular:  Negative for chest pain. Gastrointestinal:  Positive for abdominal pain. Negative for diarrhea, nausea and vomiting. Genitourinary:  Negative for hematuria. Skin:  Negative for wound. Neurological:  Positive for dizziness and light-headedness. Negative for weakness and numbness. Except as noted above the remainder of the review of systems was reviewed and negative.        PAST Neurological:      General: No focal deficit present. Mental Status: She is alert and oriented to person, place, and time. Cranial Nerves: No cranial nerve deficit. Sensory: No sensory deficit. Motor: No weakness. Psychiatric:         Mood and Affect: Mood normal.       DIAGNOSTIC RESULTS     EKG: All EKG's are interpreted by the Emergency Department Physician who either signs or Co-signs this chart in the absence of a cardiologist.        RADIOLOGY:   Non-plain film images such as CT, Ultrasound and MRI are read by the radiologist. Plain radiographic images are visualized and preliminarily interpreted by the emergency physician with the below findings:        Interpretation per the Radiologist below, if available at the time of this note:    XR CHEST PORTABLE   Final Result      No acute process on portable chest.         CT Head W/O Contrast   Final Result      Calcified mass along the frontal aspect of the falx in keeping with a   meningioma. Note other acute intracranial abnormality.           MRI BRAIN W WO CONTRAST    (Results Pending)        LABS:  Labs Reviewed   CBC WITH AUTO DIFFERENTIAL - Abnormal; Notable for the following components:       Result Value    RBC 2.27 (*)     Hemoglobin 7.2 (*)     Hematocrit 22.5 (*)     MCV 99.1 (*)     All other components within normal limits   COMPREHENSIVE METABOLIC PANEL - Abnormal; Notable for the following components:    Chloride 112 (*)     Glucose 109 (*)     BUN 69 (*)     Bun/Cre Ratio 69 (*)     Est, Glom Filt Rate 59 (*)     AST 13 (*)     Alk Phosphatase 36 (*)     Total Protein 5.5 (*)     Albumin 2.9 (*)     All other components within normal limits   URINALYSIS WITH MICROSCOPIC - Abnormal; Notable for the following components:    Leukocyte Esterase, Urine SMALL (*)     All other components within normal limits   MAGNESIUM - Abnormal; Notable for the following components:    Magnesium 1.5 (*)     All other components within normal

## 2023-08-07 NOTE — PLAN OF CARE
Problem: Occupational Therapy - Adult  Goal: By Discharge: Performs self-care activities at highest level of function for planned discharge setting. See evaluation for individualized goals. Description: FUNCTIONAL STATUS PRIOR TO ADMISSION:  Patient was ambulatory not using AE   , ADL Assistance: Independent,  ,  ,  ,  ,  , Homemaking Assistance: Independent, Ambulation Assistance: Independent, Transfer Assistance: Independent, Active : Yes     HOME SUPPORT: Patient lived with daughter but didn't require assistance. Occupational Therapy Goals:  Initiated 8/7/2023  1. Patient will perform self-feeding with [place] within 7 day(s). 2.  Patient will perform grooming with Modified [place] within 7 day(s). 3.  Patient will perform bathing with Modified [place] within 7 day(s). 4.  Patient will perform toilet transfers with Modified [place]  within 7 day(s). 5.  Patient will perform all aspects of toileting with [place] within 7 day(s). 6.  Patient will participate in upper extremity therapeutic exercise/activities with [place] for 5 minutes within 7 day(s). 7.  Patient will utilize energy conservation techniques during functional activities with verbal cues within 7 day(s). Outcome: Progressing   OCCUPATIONAL THERAPY EVALUATION    Patient: Lisa De La Fuente (29 y.o. female)  Date: 8/7/2023  Primary Diagnosis: Hypomagnesemia [E83.42]  Dizziness [R42]  Brain mass [G93.89]  Symptomatic anemia [D64.9]  Urinary tract infection without hematuria, site unspecified [N39.0]  Other fatigue [R53.83]  Anemia, unspecified type [D64.9]         Precautions:                    ASSESSMENT :  The patient is limited by decreased functional mobility, independence in ADLs, high-level IADLs, strength, body mechanics, activity tolerance, endurance, safety awareness, balance.  Per chart review pt was admitted with symptomatic anemia, brain mass, dizziness, hypomagnesemia, UTI, Prior to

## 2023-08-07 NOTE — PROGRESS NOTES
PHYSICAL THERAPY 8/7/2023    Chart reviewed. PT eval orders received and acknowledged. Chart reviewed, pt HGB is currently 5.6 with plans to transfuse. Will hold for now follow up as medically appropriate. Will continue to follow.      Cecilia Le, PT

## 2023-08-07 NOTE — PROGRESS NOTES
Patient was sitting on the toilet then all of a sudden became unresponsive but had a pulse. MD at patients side talking to her. MD and RN slide patient on floor. RRT called. RRT at bedside. Orders placed. Patient stable, resting in bed. Plan of care ongoing.

## 2023-08-07 NOTE — SIGNIFICANT EVENT
Rapid Response  Rapid response room 439A called at this time. RRT responding. Pt was in the restroom when a provider came to see her. Per nursing staff, she attempted to stand up to let him know where she was and had a near syncopal episode. She was lowered to the ground by staff, did not hit head. SBP 70's upon RRT arrival. Sheet placed under pt and was lifted onto the bed for safety. Pt answering questions, apologizing. BP improved once supine in bed, 's, MAP 70. Dr. Lucita Cook at bedside. EKG completed, provider to review. Lab orders received. Checked in with primary RN prior to leaving. Opportunity for questions and concerns provided. Sepsis Screening  Are two or more SIRS criteria present? No    Is the patient's history suggestive of a new infection?  No

## 2023-08-07 NOTE — PROGRESS NOTES
Bedside shift change report given to Lauren León (oncoming nurse) by Lennox Westfall RN (offgoing nurse). Report included the following information  Index, Intake/Output, MAR, and Cardiac Rhythm NSR. Shift worked:  0730-2000     Shift summary and any significant changes:     Pt received 2 units of blood this morning. Hemoglobin low, waiting for blood bank to call for next transfusion. RRT called due to pt becoming unresponsive. Pt stable and resting in bed.            Neuro:  Oriented X- 4      Cardiac:  HR- 76  BP- 103/55  Rhythm-  NSR     Repiratory:  02 Sat- 100%  02 device- RA         GI:  Bowel sounds- active   Last BM- 8/7/2023  Stool characteristics- small     :  Voiding?- yes  Urine characteristics- yellow        Skin:  Intact?- yes           Access:  L AC & R Forearm

## 2023-08-07 NOTE — PROGRESS NOTES
Bedside shift change report given to Lennox Westfall RN (oncoming nurse) by Haile Dias RN (offgoing nurse). Report included the following information Nurse Handoff Report.

## 2023-08-08 ENCOUNTER — APPOINTMENT (OUTPATIENT)
Facility: HOSPITAL | Age: 73
DRG: 377 | End: 2023-08-08
Attending: HOSPITALIST
Payer: MEDICARE

## 2023-08-08 ENCOUNTER — APPOINTMENT (OUTPATIENT)
Facility: HOSPITAL | Age: 73
DRG: 377 | End: 2023-08-08
Payer: MEDICARE

## 2023-08-08 LAB
ALBUMIN SERPL-MCNC: 2.6 G/DL (ref 3.5–5)
ALBUMIN/GLOB SERPL: 1.2 (ref 1.1–2.2)
ALP SERPL-CCNC: 29 U/L (ref 45–117)
ALT SERPL-CCNC: 14 U/L (ref 12–78)
ANION GAP SERPL CALC-SCNC: 4 MMOL/L (ref 5–15)
APTT PPP: 21.3 SEC (ref 22.1–31)
AST SERPL-CCNC: 9 U/L (ref 15–37)
BASOPHILS # BLD: 0 K/UL (ref 0–0.1)
BASOPHILS NFR BLD: 0 % (ref 0–1)
BILIRUB SERPL-MCNC: 0.6 MG/DL (ref 0.2–1)
BUN SERPL-MCNC: 48 MG/DL (ref 6–20)
BUN/CREAT SERPL: 71 (ref 12–20)
CALCIUM SERPL-MCNC: 7.9 MG/DL (ref 8.5–10.1)
CHLORIDE SERPL-SCNC: 120 MMOL/L (ref 97–108)
CO2 SERPL-SCNC: 23 MMOL/L (ref 21–32)
CREAT SERPL-MCNC: 0.68 MG/DL (ref 0.55–1.02)
DIFFERENTIAL METHOD BLD: ABNORMAL
ECHO AO ROOT DIAM: 2.6 CM
ECHO AO ROOT INDEX: 1.76 CM/M2
ECHO AV AREA PEAK VELOCITY: 2.5 CM2
ECHO AV AREA VTI: 2.6 CM2
ECHO AV AREA/BSA PEAK VELOCITY: 1.7 CM2/M2
ECHO AV AREA/BSA VTI: 1.8 CM2/M2
ECHO AV MEAN GRADIENT: 5 MMHG
ECHO AV MEAN VELOCITY: 1 M/S
ECHO AV PEAK GRADIENT: 9 MMHG
ECHO AV PEAK VELOCITY: 1.5 M/S
ECHO AV VELOCITY RATIO: 0.8
ECHO AV VTI: 31.5 CM
ECHO BSA: 1.47 M2
ECHO EST RA PRESSURE: 15 MMHG
ECHO LA DIAMETER INDEX: 2.5 CM/M2
ECHO LA DIAMETER: 3.7 CM
ECHO LA TO AORTIC ROOT RATIO: 1.42
ECHO LA VOL 2C: 40 ML (ref 22–52)
ECHO LA VOL 2C: 41 ML (ref 22–52)
ECHO LA VOL 4C: 42 ML (ref 22–52)
ECHO LA VOL 4C: 46 ML (ref 22–52)
ECHO LA VOLUME AREA LENGTH: 46 ML
ECHO LA VOLUME INDEX AREA LENGTH: 31 ML/M2 (ref 16–34)
ECHO LV E' LATERAL VELOCITY: 9 CM/S
ECHO LV E' SEPTAL VELOCITY: 6 CM/S
ECHO LV EDV A2C: 56 ML
ECHO LV EDV A4C: 66 ML
ECHO LV EDV BP: 61 ML (ref 56–104)
ECHO LV EDV INDEX A4C: 45 ML/M2
ECHO LV EDV INDEX BP: 41 ML/M2
ECHO LV EDV NDEX A2C: 38 ML/M2
ECHO LV EJECTION FRACTION A2C: 71 %
ECHO LV EJECTION FRACTION A4C: 68 %
ECHO LV EJECTION FRACTION BIPLANE: 69 % (ref 55–100)
ECHO LV ESV A2C: 16 ML
ECHO LV ESV A4C: 21 ML
ECHO LV ESV BP: 19 ML (ref 19–49)
ECHO LV ESV INDEX A2C: 11 ML/M2
ECHO LV ESV INDEX A4C: 14 ML/M2
ECHO LV ESV INDEX BP: 13 ML/M2
ECHO LV FRACTIONAL SHORTENING: 39 % (ref 28–44)
ECHO LV INTERNAL DIMENSION DIASTOLE INDEX: 2.97 CM/M2
ECHO LV INTERNAL DIMENSION DIASTOLIC: 4.4 CM (ref 3.9–5.3)
ECHO LV INTERNAL DIMENSION SYSTOLIC INDEX: 1.82 CM/M2
ECHO LV INTERNAL DIMENSION SYSTOLIC: 2.7 CM
ECHO LV IVSD: 0.9 CM (ref 0.6–0.9)
ECHO LV MASS 2D: 137.8 G (ref 67–162)
ECHO LV MASS INDEX 2D: 93.1 G/M2 (ref 43–95)
ECHO LV POSTERIOR WALL DIASTOLIC: 1 CM (ref 0.6–0.9)
ECHO LV RELATIVE WALL THICKNESS RATIO: 0.45
ECHO LVOT AREA: 3.1 CM2
ECHO LVOT AV VTI INDEX: 0.8
ECHO LVOT DIAM: 2 CM
ECHO LVOT MEAN GRADIENT: 2 MMHG
ECHO LVOT PEAK GRADIENT: 5 MMHG
ECHO LVOT PEAK VELOCITY: 1.2 M/S
ECHO LVOT STROKE VOLUME INDEX: 53.7 ML/M2
ECHO LVOT SV: 79.4 ML
ECHO LVOT VTI: 25.3 CM
ECHO MV A VELOCITY: 0.77 M/S
ECHO MV AREA PHT: 3.5 CM2
ECHO MV E DECELERATION TIME (DT): 216.4 MS
ECHO MV E VELOCITY: 0.92 M/S
ECHO MV E/A RATIO: 1.19
ECHO MV E/E' LATERAL: 10.22
ECHO MV E/E' RATIO (AVERAGED): 12.78
ECHO MV E/E' SEPTAL: 15.33
ECHO MV PRESSURE HALF TIME (PHT): 62.8 MS
ECHO PULMONARY ARTERY END DIASTOLIC PRESSURE: 11 MMHG
ECHO PV MAX VELOCITY: 1.1 M/S
ECHO PV PEAK GRADIENT: 5 MMHG
ECHO RIGHT VENTRICULAR SYSTOLIC PRESSURE (RVSP): 58 MMHG
ECHO RV FREE WALL PEAK S': 15 CM/S
ECHO RV LONGITUDINAL DIMENSION: 3.8 CM
ECHO RV MID DIMENSION: 2.6 CM
ECHO RV TAPSE: 1.7 CM (ref 1.7–?)
ECHO RVOT AREA: 2.5 CM2
ECHO RVOT DIAMETER: 1.8 CM
ECHO TV REGURGITANT MAX VELOCITY: 3.27 M/S
ECHO TV REGURGITANT PEAK GRADIENT: 43 MMHG
EOSINOPHIL # BLD: 0 K/UL (ref 0–0.4)
EOSINOPHIL NFR BLD: 1 % (ref 0–7)
ERYTHROCYTE [DISTWIDTH] IN BLOOD BY AUTOMATED COUNT: 15.3 % (ref 11.5–14.5)
GLOBULIN SER CALC-MCNC: 2.2 G/DL (ref 2–4)
GLUCOSE BLD STRIP.AUTO-MCNC: 103 MG/DL (ref 65–117)
GLUCOSE BLD STRIP.AUTO-MCNC: 135 MG/DL (ref 65–117)
GLUCOSE BLD STRIP.AUTO-MCNC: 88 MG/DL (ref 65–117)
GLUCOSE BLD STRIP.AUTO-MCNC: 98 MG/DL (ref 65–117)
GLUCOSE BLD STRIP.AUTO-MCNC: NORMAL MG/DL (ref 65–117)
GLUCOSE SERPL-MCNC: 91 MG/DL (ref 65–100)
HCT VFR BLD AUTO: 26.3 % (ref 35–47)
HGB BLD-MCNC: 8.3 G/DL (ref 11.5–16)
IMM GRANULOCYTES # BLD AUTO: 0.1 K/UL (ref 0–0.04)
IMM GRANULOCYTES NFR BLD AUTO: 1 % (ref 0–0.5)
INR PPP: 1 (ref 0.9–1.1)
LYMPHOCYTES # BLD: 2.9 K/UL (ref 0.8–3.5)
LYMPHOCYTES NFR BLD: 34 % (ref 12–49)
MCH RBC QN AUTO: 29.3 PG (ref 26–34)
MCHC RBC AUTO-ENTMCNC: 31.6 G/DL (ref 30–36.5)
MCV RBC AUTO: 92.9 FL (ref 80–99)
MONOCYTES # BLD: 0.4 K/UL (ref 0–1)
MONOCYTES NFR BLD: 5 % (ref 5–13)
NEUTS SEG # BLD: 4.9 K/UL (ref 1.8–8)
NEUTS SEG NFR BLD: 59 % (ref 32–75)
NRBC # BLD: 0 K/UL (ref 0–0.01)
NRBC BLD-RTO: 0 PER 100 WBC
PLATELET # BLD AUTO: 170 K/UL (ref 150–400)
PMV BLD AUTO: 10.1 FL (ref 8.9–12.9)
POTASSIUM SERPL-SCNC: 3.7 MMOL/L (ref 3.5–5.1)
PROT SERPL-MCNC: 4.8 G/DL (ref 6.4–8.2)
PROTHROMBIN TIME: 10.8 SEC (ref 9–11.1)
RBC # BLD AUTO: 2.83 M/UL (ref 3.8–5.2)
SERVICE CMNT-IMP: ABNORMAL
SERVICE CMNT-IMP: NORMAL
SODIUM SERPL-SCNC: 147 MMOL/L (ref 136–145)
THERAPEUTIC RANGE: ABNORMAL SECS (ref 58–77)
WBC # BLD AUTO: 8.3 K/UL (ref 3.6–11)

## 2023-08-08 PROCEDURE — 97535 SELF CARE MNGMENT TRAINING: CPT

## 2023-08-08 PROCEDURE — C9113 INJ PANTOPRAZOLE SODIUM, VIA: HCPCS

## 2023-08-08 PROCEDURE — 70547 MR ANGIOGRAPHY NECK W/O DYE: CPT

## 2023-08-08 PROCEDURE — 2580000003 HC RX 258: Performed by: HOSPITALIST

## 2023-08-08 PROCEDURE — 2060000000 HC ICU INTERMEDIATE R&B

## 2023-08-08 PROCEDURE — P9047 ALBUMIN (HUMAN), 25%, 50ML: HCPCS

## 2023-08-08 PROCEDURE — 6360000004 HC RX CONTRAST MEDICATION

## 2023-08-08 PROCEDURE — 70553 MRI BRAIN STEM W/O & W/DYE: CPT

## 2023-08-08 PROCEDURE — 2580000003 HC RX 258

## 2023-08-08 PROCEDURE — 80053 COMPREHEN METABOLIC PANEL: CPT

## 2023-08-08 PROCEDURE — 36415 COLL VENOUS BLD VENIPUNCTURE: CPT

## 2023-08-08 PROCEDURE — 85610 PROTHROMBIN TIME: CPT

## 2023-08-08 PROCEDURE — 93306 TTE W/DOPPLER COMPLETE: CPT

## 2023-08-08 PROCEDURE — 82962 GLUCOSE BLOOD TEST: CPT

## 2023-08-08 PROCEDURE — A9579 GAD-BASE MR CONTRAST NOS,1ML: HCPCS

## 2023-08-08 PROCEDURE — 6360000002 HC RX W HCPCS

## 2023-08-08 PROCEDURE — 85730 THROMBOPLASTIN TIME PARTIAL: CPT

## 2023-08-08 PROCEDURE — A4216 STERILE WATER/SALINE, 10 ML: HCPCS

## 2023-08-08 PROCEDURE — 6370000000 HC RX 637 (ALT 250 FOR IP): Performed by: STUDENT IN AN ORGANIZED HEALTH CARE EDUCATION/TRAINING PROGRAM

## 2023-08-08 PROCEDURE — 70544 MR ANGIOGRAPHY HEAD W/O DYE: CPT

## 2023-08-08 PROCEDURE — 2580000003 HC RX 258: Performed by: STUDENT IN AN ORGANIZED HEALTH CARE EDUCATION/TRAINING PROGRAM

## 2023-08-08 PROCEDURE — 85025 COMPLETE CBC W/AUTO DIFF WBC: CPT

## 2023-08-08 RX ORDER — SODIUM CHLORIDE, SODIUM LACTATE, POTASSIUM CHLORIDE, CALCIUM CHLORIDE 600; 310; 30; 20 MG/100ML; MG/100ML; MG/100ML; MG/100ML
INJECTION, SOLUTION INTRAVENOUS CONTINUOUS
Status: DISCONTINUED | OUTPATIENT
Start: 2023-08-08 | End: 2023-08-08

## 2023-08-08 RX ORDER — ALBUMIN (HUMAN) 12.5 G/50ML
25 SOLUTION INTRAVENOUS ONCE
Status: COMPLETED | OUTPATIENT
Start: 2023-08-08 | End: 2023-08-08

## 2023-08-08 RX ORDER — SODIUM CHLORIDE 450 MG/100ML
INJECTION, SOLUTION INTRAVENOUS CONTINUOUS
Status: DISCONTINUED | OUTPATIENT
Start: 2023-08-08 | End: 2023-08-09

## 2023-08-08 RX ADMIN — ACETAMINOPHEN 650 MG: 325 TABLET ORAL at 20:57

## 2023-08-08 RX ADMIN — SODIUM CHLORIDE: 4.5 INJECTION, SOLUTION INTRAVENOUS at 09:08

## 2023-08-08 RX ADMIN — GADOTERIDOL 10 ML: 279.3 INJECTION, SOLUTION INTRAVENOUS at 23:47

## 2023-08-08 RX ADMIN — ALBUMIN (HUMAN) 25 G: 0.25 INJECTION, SOLUTION INTRAVENOUS at 04:29

## 2023-08-08 RX ADMIN — SODIUM CHLORIDE, PRESERVATIVE FREE 10 ML: 5 INJECTION INTRAVENOUS at 09:24

## 2023-08-08 RX ADMIN — SODIUM CHLORIDE, POTASSIUM CHLORIDE, SODIUM LACTATE AND CALCIUM CHLORIDE: 600; 310; 30; 20 INJECTION, SOLUTION INTRAVENOUS at 04:28

## 2023-08-08 RX ADMIN — SODIUM CHLORIDE: 4.5 INJECTION, SOLUTION INTRAVENOUS at 21:07

## 2023-08-08 RX ADMIN — SODIUM CHLORIDE 40 MG: 9 INJECTION INTRAMUSCULAR; INTRAVENOUS; SUBCUTANEOUS at 20:56

## 2023-08-08 RX ADMIN — SODIUM CHLORIDE, PRESERVATIVE FREE 10 ML: 5 INJECTION INTRAVENOUS at 20:57

## 2023-08-08 RX ADMIN — SODIUM CHLORIDE 40 MG: 9 INJECTION INTRAMUSCULAR; INTRAVENOUS; SUBCUTANEOUS at 09:18

## 2023-08-08 ASSESSMENT — PAIN SCALES - GENERAL: PAINLEVEL_OUTOF10: 0

## 2023-08-08 NOTE — CONSULTS
Comprehensive Nutrition Assessment    Type and Reason for Visit: Initial, Consult    Nutrition Recommendations/Plan:   Continue regular diet  Counseled on adequate kcal/ protein intake, not overly restricting       Malnutrition Assessment:  Malnutrition Status: Moderate malnutrition (08/08/23 1651)    Context:  Chronic Illness     Findings of the 6 clinical characteristics of malnutrition:  Energy Intake:  No significant decrease in energy intake  Weight Loss:  Greater than 10% over 6 months     Body Fat Loss:  Mild body fat loss Orbital, Triceps   Muscle Mass Loss:  Mild muscle mass loss Temples (temporalis), Clavicles (pectoralis & deltoids)  Fluid Accumulation:  No significant fluid accumulation     Strength:  Not Performed       Nutrition Assessment:    Past Medical History:   Diagnosis Date    Chronic kidney disease     Diabetes ([address])     Hypertension        Pt admitted with Hypomagnesemia [E83.42]  Dizziness [R42]  Brain mass [G93.89]  Symptomatic anemia [D64.9]  Urinary tract infection without hematuria, site unspecified [N39.0]  Other fatigue [R53.83]  Anemia, unspecified type [D64.9]  Plan for EGD/colonoscopy Thursday. RD consulted for \"weight loss\". Initially very hazy with information reported, but when I followed up with more specific questions she was able to clarify. Reports she used to weight >200 lb, years ago. States in around Aug 2022 she went on a strict kidney diet and go down to 94 lb. She completely avoided protein from meat and dairy and minimized intake overall. She believes she was around 114-115 lb at the time she started this diet. Reports she followed this diet x 6 months from August 2022-Dec/Robert 23 when she followed back up with her kidney doctor and her numbers looked better. Pt lost 20 lb, 17.5% of her BW in 6 months. Exhibits muscle loss and SQ fat loss, but per her reports, she is improving and therefore, difficult to dx her with severe PCM.   Favor moderate PCM at

## 2023-08-08 NOTE — PROGRESS NOTES
[address] [facility]  Hospitalist Group                                                                                          Hospitalist Progress Note  Angela Fofana MD  Office Phone: [phone]        Date of Service:  2023  NAME:  Cari Sebastian  :  1950  MRN:  [id number]       Admission Summary:   Cari Sebastian is a 74 y.o. female with history of hypertension, diabetes, iron deficiency anemia, CKD who is admitted for symptomatic anemia. Interval history / Subjective:   No acute events overnight, received 1U PRBC last night for hgb 5.9, no dizziness, CP, dyspnea. Rapid response in the afternoon for syncope. Assessment & Plan:     Anemia: now w/ suspected GI blood loss  -history of AMOS per the pt however iron studies not c/w this, may be due to acute bleeding not reflecting iron-def yet  -B12, folate ok, no evidence of hemolysis, however retics not appropriately elevated  -check stool for occult blood - positive, stool is brown but RN noted some red blood mixed in  -consult hematology - recs appreciated  -s/p 3U PRBCs, last  PM  -continue IV PPI  -consult GI    Incidental meningeoma on CT head; follow-up MRI brain  Pt also notes instances of feeling lightheaded whenever she tilts her head back, so will image the neck along with the brain (MRA)    Rapid response for syncope   -no events on tele and EKG non-ischemic, possibly vasovagal exacerbated by anemia  -continue tele monitoring    Type 2 DM w/ hyperglycemia:  -SSI/POC checks  -hold metformin    HTN:  -meds held due to borderline hypotension    CKD II-III per the patient, monitor. Also endorses weight loss which she blames on a \"kidney diet,\" states a doctor told her to avoid protein, dairy, and to mainly eat vegetables. Will ask nutrition to advise her while here, older/frail patient should not be so heavily protein or calorie restricted.  Pt seems to have misunderstood recs and is completely avoiding reviewed all pertinent labs, diagnostic studies, imaging, and have provided independent interpretation of the same. Labs:     Recent Labs     08/07/23  1652 08/08/23  0147   WBC 8.6 8.3   HGB 5.9* 8.3*   HCT 18.7* 26.3*    170       Recent Labs     08/05/23 2009 08/06/23 2130 08/07/23  1004 08/07/23  1647 08/08/23  0147    140 142 145 147*   K 4.0 4.2 4.1 4.4 3.7    112* 113* 118* 120*   CO2 27 23 23 22 23   BUN 78* 69* 60* 61* 48*   MG 1.4* 1.5*  --   --   --    PHOS 2.6  --   --   --   --        Recent Labs     08/06/23 2130 08/07/23  1004 08/08/23  0147   ALT 18 13 14   GLOB 2.6 2.0 2.2       Recent Labs     08/08/23  0146   INR 1.0   APTT 21.3*      Recent Labs     08/07/23  0219   TIBC 243*        No results found for: FOL, RBCF   No results for input(s): PH, PCO2, PO2 in the last 72 hours. No results for input(s): CPK in the last 72 hours. Invalid input(s): CPKMB, CKNDX, TROIQ  No results found for: CHOL, CHOLX, CHLST, CHOLV, HDL, HDLC, LDL, LDLC, TGLX, TRIGL  No results found for: GLUCPOC  [unfilled]    Notes reviewed from all clinical/nonclinical/nursing services involved in patient's clinical care. Care coordination discussions were held with appropriate clinical/nonclinical/ nursing providers based on care coordination needs. Patients current active Medications were reviewed, considered, added and adjusted based on the clinical condition today. Home Medications were reconciled to the best of my ability given all available resources at the time of admission. Route is PO if not otherwise noted.       Admission Status:[id number]:::1}      Medications Reviewed:     Current Facility-Administered Medications   Medication Dose Route Frequency    0.45 % sodium chloride infusion   IntraVENous Continuous    0.9 % sodium chloride infusion   IntraVENous PRN    sodium chloride flush 0.9 % injection 5-40 mL  5-40 mL IntraVENous 2 times per day    sodium chloride flush 0.9 % injection

## 2023-08-08 NOTE — CONSULTS
HEMATOLOGY / ONCOLOGY    Angie Ahmadi 1950 Age: 74 y.o. Date of service: 01/23/20   Location: [address], [place]        ACTIVE PROBLEMS  Weakness/fatigue/nausea  --Sudden loss of consciousness, of unknown etiology, 8/7/23  --Positional dizziness? Anemia of unknown etiology  --inappropriately low reticulocyte count. Type II DM  Incidental frontal meningioma  HTN          INTERIM HISTORY AND ASSESSMENT  Ms. Yadiel Izquierdo looks great today given her respiratory arrest yesterday. I'm unclear as to the cause, although it looked vasovagal and I was associated with a hgb of 5.9 even though she had been 8.4 in the am. So now she has two dramatic drops to 5.6 16 hours apart, but with brown stool in between (I saw it), though the nurse said there was a small blood clot in it. However, the nurse today said she had not developed any melena, so were have those 6 units of blood gone? The answer may be that she has a marrow disorder with marrow insufficiency. Her ritics were 2.8% drawn two hours after her hgb of 5.6 the am of 8/7/23 and this is inappropriately low. I discussed a bone marrow biopsy with her and given that she is having the scopes on Thursday, she declined to have that procedure tomorrow. I will order daily reticulocyte counts and we'll see see what her hemoglobin does and what GI finds. She does not seem to have a vitamin-deficiency anemia.          Recent Labs     08/05/23 2009 08/06/23  2130 08/07/23  0028 08/07/23  1004 08/07/23  1647 08/07/23  1652 08/08/23  0146 08/08/23  0147   WBC 9.2 9.9 9.6 12.3*  --  8.6  --  8.3   HGB 10.2* 7.2* 5.6* 8.4*  --  5.9*  --  8.3*    270 220 191  --  168  --  170   INR  --   --   --   --   --   --  1.0  --    APTT  --   --   --   --   --   --  21.3*  --     140  --  142 145  --   --  147*   K 4.0 4.2  --  4.1 4.4  --   --  3.7   ALT 15 18  --  13  --   --   --  14   MG 1.4* 1.5*  --   --   --   --   --   --    PHOS 2.6  --   --   --   --

## 2023-08-08 NOTE — PROGRESS NOTES
1930 Bedside shift change report given to Hayley Karimi RN (oncoming nurse) by Lashawn Roman RN (offgoing nurse). Report included the following information Nurse Handoff Report, Intake/Output, MAR, Recent Results, and Cardiac Rhythm NSR .     2240 Transport at bedside to take pt to MRI     0010 Pt back from [address] drawn     0100 Hgb 6.3. Nilay Ramires NP notified. Orders received for 1un PRBCs    0115 Blood transfusion started     0240 New IV placed     0400 Blood transfusion complete     0730 Bedside shift change report given to Og Murillo RN (oncoming nurse) by Hayley Karimi RN (offgoing nurse). Report included the following information Nurse Handoff Report, Intake/Output, MAR, Recent Results, and Cardiac Rhythm NSR.

## 2023-08-08 NOTE — CONSULTS
[address], [facility] [place]  [phone]                     GI CONSULTATION NOTE      NAME:  Jia Neri   :   1950   MRN:   [id number]     Consult Date: 2023     Chief Complaint: anemia    History of Present Illness:  Patient is a 74 y.o. female with PMH DM, CKD, HTN and chronic anemia who is seen in consultation at the request of Dr. Camden Huang for the above mentioned problem . Ms. Delores Valles presented to [address] [place], [place] [address] ER with dizziness and weakness. Hgb 7.2 on arrival- dropped to 5.6 she received 2 units PRBC with good response to 8.4. She again dropped to 5.9 and received another 2 units PRBC with good response- Hgb 8.3- during this time a RR was called due to syncope, hypotension and BRBPR. Last Colonoscopy/EGD was several years ago- she has a large HH. She stopped acid suppression medication ~ 6-8 months ago as advised by her nephrologist.       PMH:  Past Medical History:   Diagnosis Date    Chronic kidney disease     Diabetes ([address])     Hypertension        PSH:  No past surgical history on file. Allergies: Allergies   Allergen Reactions    Penicillins     Doxycycline Rash       Home Medications:  Prior to Admission Medications   Prescriptions Last Dose Informant Patient Reported?  Taking?   ferrous gluconate (FERGON) 324 (38 Fe) MG tablet   Yes No   Sig: Take 1 tablet by mouth daily (with breakfast)   metFORMIN (GLUCOPHAGE) 1000 MG tablet   Yes No   Sig: Take 1 tablet by mouth 2 times daily (with meals)   olmesartan-hydroCHLOROthiazide (BENICAR HCT) 20-12.5 MG per tablet   Yes No   Sig: Take 1 tablet by mouth daily      Facility-Administered Medications: None       Hospital Medications:  Current Facility-Administered Medications   Medication Dose Route Frequency    0.45 % sodium chloride infusion   IntraVENous Continuous    polyethylene glycol (GoLYTELY) solution 4,000 mL  4,000 mL Oral Once    0.9 % sodium chloride infusion

## 2023-08-08 NOTE — PROGRESS NOTES
Bedsid shift change report given to Yusuf (oncoming nurse) by [facility] BRITTANY DAVENPORT RN (offgoing nurse). Report included the following information  Index, Intake/Output, MAR, and Cardiac Rhythm NSR. Shift worked:  0730-2000     Shift summary and any significant changes:     Pt scheduled for endoscopy Thursday.            Neuro:  Oriented X- 4      Cardiac:  HR- 86  BP- 116/50  Rhythm- NSR     Repiratory:  02 Sat- 100%  02 device- RA       GI:  Bowel sounds- active   Last BM- 8/7/2023       :  Voiding?- yes   Urine characteristics- yellow        Skin:  Intact?- yes         Access: L AC & R forearm

## 2023-08-08 NOTE — PROGRESS NOTES
Per GI pts Endoscopy will be done Thursday morning. Pt diet switched to regular diet for tonight and will on clear liquid diet tomorrow. Per GI pt will start bowel prep tomorrow and to not give her the scheduled bowel prep scheduled for this evening.

## 2023-08-08 NOTE — PROGRESS NOTES
BP assessed during OT session today. Patient reported symptoms of \"weakness\" and feeling light headed with standing.         08/08/23 1500 08/08/23 1544 08/08/23 1546   Vital Signs   /61 (!) 98/52 (!) 133/43   MAP (Calculated) 81 67 73   BP Location  --   --  Left upper arm   BP Method Automatic  --   --    Patient Position Semi fowlers Standing Sitting      08/08/23 1555 08/08/23 1600   Vital Signs   BP (!) 101/49 (!) 126/46   MAP (Calculated) 66 73   BP Location Left upper arm Left upper arm   BP Method  --  Automatic   Patient Position Sitting  (immediately after ambulating in carter) Sitting

## 2023-08-09 LAB
ALBUMIN SERPL-MCNC: 2.7 G/DL (ref 3.5–5)
ALBUMIN/GLOB SERPL: 1.4 (ref 1.1–2.2)
ALP SERPL-CCNC: 23 U/L (ref 45–117)
ALT SERPL-CCNC: 18 U/L (ref 12–78)
ANION GAP SERPL CALC-SCNC: 4 MMOL/L (ref 5–15)
AST SERPL-CCNC: 12 U/L (ref 15–37)
BASOPHILS # BLD: 0 K/UL (ref 0–0.1)
BASOPHILS NFR BLD: 0 % (ref 0–1)
BILIRUB SERPL-MCNC: 0.3 MG/DL (ref 0.2–1)
BUN SERPL-MCNC: 36 MG/DL (ref 6–20)
BUN/CREAT SERPL: 44 (ref 12–20)
CALCIUM SERPL-MCNC: 8.2 MG/DL (ref 8.5–10.1)
CHLORIDE SERPL-SCNC: 118 MMOL/L (ref 97–108)
CO2 SERPL-SCNC: 23 MMOL/L (ref 21–32)
COMMENT:: NORMAL
CREAT SERPL-MCNC: 0.82 MG/DL (ref 0.55–1.02)
DIFFERENTIAL METHOD BLD: ABNORMAL
EOSINOPHIL # BLD: 0.1 K/UL (ref 0–0.4)
EOSINOPHIL NFR BLD: 1 % (ref 0–7)
ERYTHROCYTE [DISTWIDTH] IN BLOOD BY AUTOMATED COUNT: 16.2 % (ref 11.5–14.5)
GLOBULIN SER CALC-MCNC: 2 G/DL (ref 2–4)
GLUCOSE BLD STRIP.AUTO-MCNC: 105 MG/DL (ref 65–117)
GLUCOSE BLD STRIP.AUTO-MCNC: 108 MG/DL (ref 65–117)
GLUCOSE BLD STRIP.AUTO-MCNC: 135 MG/DL (ref 65–117)
GLUCOSE BLD STRIP.AUTO-MCNC: 147 MG/DL (ref 65–117)
GLUCOSE SERPL-MCNC: 110 MG/DL (ref 65–100)
HCT VFR BLD AUTO: 20 % (ref 35–47)
HCT VFR BLD AUTO: 22 % (ref 35–47)
HCT VFR BLD AUTO: 28.3 % (ref 35–47)
HGB BLD-MCNC: 6.3 G/DL (ref 11.5–16)
HGB BLD-MCNC: 7 G/DL (ref 11.5–16)
HGB BLD-MCNC: 9.3 G/DL (ref 11.5–16)
HISTORY CHECK: NORMAL
HISTORY CHECK: NORMAL
IMM GRANULOCYTES # BLD AUTO: 0.1 K/UL (ref 0–0.04)
IMM GRANULOCYTES NFR BLD AUTO: 1 % (ref 0–0.5)
LYMPHOCYTES # BLD: 2.3 K/UL (ref 0.8–3.5)
LYMPHOCYTES NFR BLD: 25 % (ref 12–49)
MCH RBC QN AUTO: 29.7 PG (ref 26–34)
MCHC RBC AUTO-ENTMCNC: 31.5 G/DL (ref 30–36.5)
MCV RBC AUTO: 94.3 FL (ref 80–99)
MONOCYTES # BLD: 0.5 K/UL (ref 0–1)
MONOCYTES NFR BLD: 6 % (ref 5–13)
NEUTS SEG # BLD: 6 K/UL (ref 1.8–8)
NEUTS SEG NFR BLD: 67 % (ref 32–75)
NRBC # BLD: 0 K/UL (ref 0–0.01)
NRBC BLD-RTO: 0 PER 100 WBC
PLATELET # BLD AUTO: 163 K/UL (ref 150–400)
PMV BLD AUTO: 10.2 FL (ref 8.9–12.9)
POTASSIUM SERPL-SCNC: 3.9 MMOL/L (ref 3.5–5.1)
PROT SERPL-MCNC: 4.7 G/DL (ref 6.4–8.2)
RBC # BLD AUTO: 2.12 M/UL (ref 3.8–5.2)
RBC MORPH BLD: ABNORMAL
RBC MORPH BLD: ABNORMAL
SERVICE CMNT-IMP: ABNORMAL
SERVICE CMNT-IMP: ABNORMAL
SERVICE CMNT-IMP: NORMAL
SERVICE CMNT-IMP: NORMAL
SODIUM SERPL-SCNC: 145 MMOL/L (ref 136–145)
SPECIMEN HOLD: NORMAL
WBC # BLD AUTO: 9 K/UL (ref 3.6–11)

## 2023-08-09 PROCEDURE — 6370000000 HC RX 637 (ALT 250 FOR IP): Performed by: HOSPITALIST

## 2023-08-09 PROCEDURE — 2580000003 HC RX 258: Performed by: STUDENT IN AN ORGANIZED HEALTH CARE EDUCATION/TRAINING PROGRAM

## 2023-08-09 PROCEDURE — A4216 STERILE WATER/SALINE, 10 ML: HCPCS

## 2023-08-09 PROCEDURE — 6360000002 HC RX W HCPCS

## 2023-08-09 PROCEDURE — 2060000000 HC ICU INTERMEDIATE R&B

## 2023-08-09 PROCEDURE — P9016 RBC LEUKOCYTES REDUCED: HCPCS

## 2023-08-09 PROCEDURE — C9113 INJ PANTOPRAZOLE SODIUM, VIA: HCPCS

## 2023-08-09 PROCEDURE — 2580000003 HC RX 258: Performed by: HOSPITALIST

## 2023-08-09 PROCEDURE — 97535 SELF CARE MNGMENT TRAINING: CPT

## 2023-08-09 PROCEDURE — 85025 COMPLETE CBC W/AUTO DIFF WBC: CPT

## 2023-08-09 PROCEDURE — 97530 THERAPEUTIC ACTIVITIES: CPT

## 2023-08-09 PROCEDURE — 36430 TRANSFUSION BLD/BLD COMPNT: CPT

## 2023-08-09 PROCEDURE — 6370000000 HC RX 637 (ALT 250 FOR IP): Performed by: STUDENT IN AN ORGANIZED HEALTH CARE EDUCATION/TRAINING PROGRAM

## 2023-08-09 PROCEDURE — 85014 HEMATOCRIT: CPT

## 2023-08-09 PROCEDURE — 80053 COMPREHEN METABOLIC PANEL: CPT

## 2023-08-09 PROCEDURE — 82962 GLUCOSE BLOOD TEST: CPT

## 2023-08-09 PROCEDURE — 6370000000 HC RX 637 (ALT 250 FOR IP)

## 2023-08-09 PROCEDURE — 85018 HEMOGLOBIN: CPT

## 2023-08-09 PROCEDURE — 2580000003 HC RX 258

## 2023-08-09 PROCEDURE — 36415 COLL VENOUS BLD VENIPUNCTURE: CPT

## 2023-08-09 RX ORDER — SODIUM CHLORIDE 9 MG/ML
INJECTION, SOLUTION INTRAVENOUS PRN
Status: DISCONTINUED | OUTPATIENT
Start: 2023-08-09 | End: 2023-08-10 | Stop reason: ALTCHOICE

## 2023-08-09 RX ORDER — SODIUM CHLORIDE, SODIUM LACTATE, POTASSIUM CHLORIDE, CALCIUM CHLORIDE 600; 310; 30; 20 MG/100ML; MG/100ML; MG/100ML; MG/100ML
INJECTION, SOLUTION INTRAVENOUS CONTINUOUS
Status: DISCONTINUED | OUTPATIENT
Start: 2023-08-09 | End: 2023-08-10

## 2023-08-09 RX ADMIN — SODIUM CHLORIDE 40 MG: 9 INJECTION INTRAMUSCULAR; INTRAVENOUS; SUBCUTANEOUS at 21:18

## 2023-08-09 RX ADMIN — ACETAMINOPHEN 650 MG: 325 TABLET ORAL at 21:18

## 2023-08-09 RX ADMIN — SODIUM CHLORIDE, POTASSIUM CHLORIDE, SODIUM LACTATE AND CALCIUM CHLORIDE: 600; 310; 30; 20 INJECTION, SOLUTION INTRAVENOUS at 09:15

## 2023-08-09 RX ADMIN — SODIUM CHLORIDE, PRESERVATIVE FREE 10 ML: 5 INJECTION INTRAVENOUS at 09:15

## 2023-08-09 RX ADMIN — LIDOCAINE 4%: 4 CREAM TOPICAL at 09:31

## 2023-08-09 RX ADMIN — POLYETHYLENE GLYCOL-3350 AND ELECTROLYTES 4000 ML: 236; 6.74; 5.86; 2.97; 22.74 POWDER, FOR SOLUTION ORAL at 14:09

## 2023-08-09 RX ADMIN — SODIUM CHLORIDE, POTASSIUM CHLORIDE, SODIUM LACTATE AND CALCIUM CHLORIDE: 600; 310; 30; 20 INJECTION, SOLUTION INTRAVENOUS at 17:19

## 2023-08-09 RX ADMIN — LIDOCAINE 4%: 4 CREAM TOPICAL at 21:10

## 2023-08-09 RX ADMIN — ACETAMINOPHEN 650 MG: 325 TABLET ORAL at 02:15

## 2023-08-09 RX ADMIN — SODIUM CHLORIDE 40 MG: 9 INJECTION INTRAMUSCULAR; INTRAVENOUS; SUBCUTANEOUS at 09:14

## 2023-08-09 RX ADMIN — LIDOCAINE 4%: 4 CREAM TOPICAL at 01:43

## 2023-08-09 ASSESSMENT — PAIN SCALES - GENERAL
PAINLEVEL_OUTOF10: 0
PAINLEVEL_OUTOF10: 0

## 2023-08-09 NOTE — PROGRESS NOTES
Bedside and Verbal shift change report given to Jerardo Disla (oncoming nurse) by Kymberly Melendez (offgoing nurse).  Report included the following information Nurse Handoff Report, Index, Intake/Output, MAR, and Cardiac Rhythm SR .

## 2023-08-09 NOTE — CONSULTS
HEMATOLOGY / ONCOLOGY    Latonia Corbett 1950 Age: 74 y.o. Date of service: 01/23/20   Location: [address], [place]        ACTIVE PROBLEMS  Weakness/fatigue/nausea  --Sudden loss of consciousness, of unknown etiology, 8/7/23  --Positional dizziness? Anemia of unknown etiology  --inappropriately low reticulocyte count. Type II DM  Incidental frontal meningioma  HTN          INTERIM HISTORY AND ASSESSMENT  Ms. Sherice Dinh looks good today. She has not had any more syncopal episodes. However, she has dropped her hematocrit dramatically 3 times this hospitalization, implying that she has acute bleeding. However, CT scan as negative and she denies any melena. Her hemoglobin has dropped from 7.2 to 5.6, then 8.4 to 5.9, then 8.3 to 6.3. As such, she is to undergo EGD and colonoscopy tomorrow. She is doing the prep now. Her reticulocyte count was inappropriately low at 3.4% (should be in double digits if she were truly bleeding and marrow was working normally. So, I think she has at a minimum relative iron deficiency with a ferritin of 27 and perhaps an even more significant marrow disorder. Marrow biopsy should be the next step after GI w/u. I will order some IV iron for tomorrow AM.         Recent Labs     08/06/23  2130 08/07/23  0028 08/07/23  1004 08/07/23  1647 08/07/23  1652 08/08/23  0146 08/08/23  0147 08/09/23  0019 08/09/23  0927   WBC 9.9   < > 12.3*  --  8.6  --  8.3 9.0  --    HGB 7.2*   < > 8.4*  --  5.9*  --  8.3* 6.3* 7.0*      < > 191  --  168  --  170 163  --    INR  --   --   --   --   --  1.0  --   --   --    APTT  --   --   --   --   --  21.3*  --   --   --      --  142 145  --   --  147* 145  --    K 4.2  --  4.1 4.4  --   --  3.7 3.9  --    ALT 18  --  13  --   --   --  14 18  --    MG 1.5*  --   --   --   --   --   --   --   --     < > = values in this interval not displayed.           Past Medical History:   Diagnosis Date    Chronic kidney disease Diabetes ([address])     Hypertension      No past surgical history on file. Medications and Allergies have been reviewed and updated in the [facility] system. REVIEW OF SYSTEMS  Except as noted in the history and assessement above, all other systems are negative. EXAMINATION   Vital signs were reviewed abnormalities discussed above. General: Looks well. After she woke up  - alert  HEENT: conjunctiva clear; no jaundice    Cervical nodes: none palpable  Supraclavicular nodes: none palpable  Axillary nodes: none palpable  Inguinal nodes: none palpable  Lungs: clear  CV: RRR  Abd: soft and non-tender; no HSM; no masses  Skin: no significant rashes  Ext: Edema: none; Tenderness: none  Neuro/Psych:  Gait: too weak  Speech: NL   Affect: NL  Cognition: NL   See history and assessment above for further details of mental status. Social History     Tobacco Use    Smoking status: Not on file    Smokeless tobacco: Not on file   Substance Use Topics    Alcohol use: Not on file      No family history on file.      Choco Park MD

## 2023-08-09 NOTE — PLAN OF CARE
Problem: Occupational Therapy - Adult  Goal: By Discharge: Performs self-care activities at highest level of function for planned discharge setting. See evaluation for individualized goals. Description: FUNCTIONAL STATUS PRIOR TO ADMISSION:  Patient was ambulatory not using AE   , ADL Assistance: Independent,  ,  ,  ,  ,  , Homemaking Assistance: Independent, Ambulation Assistance: Independent, Transfer Assistance: Independent, Active : Yes     HOME SUPPORT: Patient lived with daughter but didn't require assistance. Occupational Therapy Goals:  Initiated 8/7/2023  1. Patient will perform self-feeding with Applegate within 7 day(s). 2.  Patient will perform grooming with Modified [place]legate within 7 day(s). 3.  Patient will perform bathing with Modified Applegate within 7 day(s). 4.  Patient will perform toilet transfers with Modified Applegate  within 7 day(s). 5.  Patient will perform all aspects of toileting with Applegate within 7 day(s). 6.  Patient will participate in upper extremity therapeutic exercise/activities with Applegate for 5 minutes within 7 day(s). 7.  Patient will utilize energy conservation techniques during functional activities with verbal cues within 7 day(s). Outcome: Progressing   OCCUPATIONAL THERAPY TREATMENT  Patient: Howie Castillo (13 y.o. female)  Date: 8/9/2023  Primary Diagnosis: Hypomagnesemia [E83.42]  Dizziness [R42]  Brain mass [G93.89]  Symptomatic anemia [D64.9]  Urinary tract infection without hematuria, site unspecified [N39.0]  Other fatigue [R53.83]  Anemia, unspecified type [D64.9]  Procedure(s) (LRB):  EGD ESOPHAGOGASTRODUODENOSCOPY (N/A)  COLONOSCOPY DIAGNOSTIC (N/A)     Precautions: Fall Risk, Other (comment) (bed/chair alarm)                Chart, occupational therapy assessment, plan of care, and goals were reviewed. ASSESSMENT  Patient continues to benefit from skilled OT services and is progressing towards goals.  Patient demonstrates improving standing tolerance during ADL, BP stable. She displays some anxiety related to current medical situation and events thus far (MRI, prepping for EGD). Patient nearing baseline ADL performance however OT will continue to follow to assess performance in case of surgical procedure. PLAN :  Patient continues to benefit from skilled intervention to address the above impairments. Continue treatment per established plan of care to address goals. Recommend with staff: encourage ADL participation     Recommend next OT session: ADL in series. Standing ADL    Recommendation for discharge: (in order for the patient to meet his/her long term goals): Therapy 2 days/week in the home    Other factors to consider for discharge: no additional factors    IF patient discharges home will need the following DME: continuing to assess with progress       SUBJECTIVE:   Patient pleasant and cooeprative     OBJECTIVE DATA SUMMARY:   Cognitive/Behavioral Status:  Orientation  Overall Orientation Status: Within Normal Limits  Orientation Level: Oriented X4       Functional Mobility and Transfers for ADLs:  Bed Mobility:        Transfers:   Transfer Training  Sit to Stand: Modified independent  Stand to Sit: Modified independent  Bed to Chair: Modified independent  Toilet Transfer: Modified independent      Balance:       Intact sitting  Intact static standing       ADL Intervention:       Grooming: Modified independent    Grooming Skilled Clinical Factors: standing at sink    UE Bathing: Setup       LE Bathing: Setup       UE Dressing: Setup       LE Dressing: Setup       Toileting: Modified independent                Pain Ratin/10   Pain Intervention(s): Activity Tolerance:   BP stable   Please refer to the flowsheet for vital signs taken during this treatment.     After treatment:   Patient left in no apparent distress sitting up in chair and Call bell within reach    COMMUNICATION/EDUCATION:

## 2023-08-09 NOTE — PROGRESS NOTES
PHYSICAL THERAPY 8/9/2023    Chart reviewed and attempted session. Pt currently with Hgb 6.3, unsure if pt has orders to transfuse, will discuss with nurse. Pt also with plans for upper endoscopy and colonoscopy today per GI note. Will attempt when appropriate. Will continue to follow.      Gena Arceo, PT

## 2023-08-09 NOTE — PLAN OF CARE
Problem: Physical Therapy - Adult  Goal: By Discharge: Performs mobility at highest level of function for planned discharge setting. See evaluation for individualized goals. Description: FUNCTIONAL STATUS PRIOR TO ADMISSION: Patient was independent and active without use of DME.    HOME SUPPORT PRIOR TO ADMISSION: The patient lived alone with daughter living close to provide assistance. Physical Therapy Goals  Initiated 8/7/2023  1. Patient will move from supine to sit and sit to supine, scoot up and down, and roll side to side in bed with independence within 7 day(s). 2.  Patient will perform sit to stand with modified independence within 7 day(s). 3.  Patient will transfer from bed to chair and chair to bed with modified independence using the least restrictive device within 7 day(s). 4.  Patient will ambulate with modified independence for 200 feet with the least restrictive device within 7 day(s). 5.  Patient will ascend/descend 4 stairs with unilateral handrail(s) and LRAD with modified independence within 7 day(s). Outcome: Progressing     PHYSICAL THERAPY TREATMENT    Patient: Charissa Blackwood (36 y.o. female)  Date: 8/9/2023  Diagnosis: Hypomagnesemia [E83.42]  Dizziness [R42]  Brain mass [G93.89]  Symptomatic anemia [D64.9]  Urinary tract infection without hematuria, site unspecified [N39.0]  Other fatigue [R53.83]  Anemia, unspecified type [D64.9] Symptomatic anemia  Procedure(s) (LRB):  EGD ESOPHAGOGASTRODUODENOSCOPY (N/A)  COLONOSCOPY DIAGNOSTIC (N/A)    Precautions: Fall Risk, Other (comment) (bed and chair alarm)                    ASSESSMENT:  Patient continues to benefit from skilled PT services and is progressing towards goals. Pt is able to ambulate CGA with RW today x 120 feet. Pt BP appears more stable today 110/54 seated to 123/69 standing after ambulation. Pt appears much more steady when using RW.  She is expected to progress towards acute PT goals pending medical course and hemodynamic stability. Will continue to follow. PLAN:  Patient continues to benefit from skilled intervention to address the above impairments. Continue treatment per established plan of care. Recommendation for discharge: (in order for the patient to meet his/her long term goals): Therapy 2 days/week in the home and assist for safety    Other factors to consider for discharge: lives alone    IF patient discharges home will need the following DME: continuing to assess with progress       SUBJECTIVE:   Patient stated, \"I'd love to walk it makes me feel better. \"    OBJECTIVE DATA SUMMARY:   Critical Behavior:  Orientation  Overall Orientation Status: Within Normal Limits  Orientation Level: Oriented X4       Functional Mobility Training:  Bed Mobility:  Bed Mobility Training  Bed Mobility Training: No (pt received sitting OOB to chair)  Transfers:  Transfer Training  Transfer Training: Yes  Sit to Stand: Supervision  Stand to Sit: Supervision  Bed to Chair: Supervision  Toilet Transfer: Modified independent  Balance:      Ambulation/Gait Training:     Gait  Overall Level of Assistance: Contact-guard assistance;Assist X1  Interventions: Safety awareness training;Verbal cues  Base of Support: Narrowed  Speed/Agatha: Fluctuations  Gait Abnormalities: Trunk sway increased  Distance (ft): 120 Feet  Assistive Device: Gait belt;Walker, rolling    Pain Rating:  Pt denies pain at this time   Pain Intervention(s):   N/a pt denies pain     Activity Tolerance:   Good and requires rest breaks  BP seated pre activity: 110/54 (70)  BP standing after ambulation: 123/69 (85)    After treatment:   Patient left in no apparent distress sitting up in chair, Call bell within reach, Bed/ chair alarm activated, and verbal hand off provided to RN       COMMUNICATION/EDUCATION:   The patient's plan of care was discussed with: registered nurse    Patient Education  Education Given To: Patient  Education Provided: Role of

## 2023-08-10 ENCOUNTER — ANESTHESIA (OUTPATIENT)
Facility: HOSPITAL | Age: 73
End: 2023-08-10
Payer: MEDICARE

## 2023-08-10 ENCOUNTER — ANESTHESIA EVENT (OUTPATIENT)
Facility: HOSPITAL | Age: 73
End: 2023-08-10
Payer: MEDICARE

## 2023-08-10 LAB
ABO + RH BLD: NORMAL
BASOPHILS # BLD: 0 K/UL (ref 0–0.1)
BASOPHILS NFR BLD: 0 % (ref 0–1)
BLD PROD TYP BPU: NORMAL
BLOOD BANK DISPENSE STATUS: NORMAL
BLOOD GROUP ANTIBODIES SERPL: NORMAL
BPU ID: NORMAL
CROSSMATCH RESULT: NORMAL
DIFFERENTIAL METHOD BLD: ABNORMAL
EOSINOPHIL # BLD: 0.1 K/UL (ref 0–0.4)
EOSINOPHIL NFR BLD: 1 % (ref 0–7)
ERYTHROCYTE [DISTWIDTH] IN BLOOD BY AUTOMATED COUNT: 15.7 % (ref 11.5–14.5)
GLUCOSE BLD STRIP.AUTO-MCNC: 120 MG/DL (ref 65–117)
GLUCOSE BLD STRIP.AUTO-MCNC: 142 MG/DL (ref 65–117)
GLUCOSE BLD STRIP.AUTO-MCNC: 169 MG/DL (ref 65–117)
GLUCOSE BLD STRIP.AUTO-MCNC: 89 MG/DL (ref 65–117)
HCT VFR BLD AUTO: 26.9 % (ref 35–47)
HGB BLD-MCNC: 8.9 G/DL (ref 11.5–16)
IMM GRANULOCYTES # BLD AUTO: 0 K/UL (ref 0–0.04)
IMM GRANULOCYTES NFR BLD AUTO: 0 % (ref 0–0.5)
LYMPHOCYTES # BLD: 2.5 K/UL (ref 0.8–3.5)
LYMPHOCYTES NFR BLD: 37 % (ref 12–49)
MCH RBC QN AUTO: 30 PG (ref 26–34)
MCHC RBC AUTO-ENTMCNC: 33.1 G/DL (ref 30–36.5)
MCV RBC AUTO: 90.6 FL (ref 80–99)
MONOCYTES # BLD: 0.4 K/UL (ref 0–1)
MONOCYTES NFR BLD: 6 % (ref 5–13)
NEUTS SEG # BLD: 3.8 K/UL (ref 1.8–8)
NEUTS SEG NFR BLD: 56 % (ref 32–75)
NRBC # BLD: 0 K/UL (ref 0–0.01)
NRBC BLD-RTO: 0 PER 100 WBC
PLATELET # BLD AUTO: 211 K/UL (ref 150–400)
PMV BLD AUTO: 10.2 FL (ref 8.9–12.9)
RBC # BLD AUTO: 2.97 M/UL (ref 3.8–5.2)
RETICS # AUTO: 0.11 M/UL (ref 0.02–0.08)
RETICS/RBC NFR AUTO: 3.9 % (ref 0.7–2.1)
SERVICE CMNT-IMP: ABNORMAL
SERVICE CMNT-IMP: NORMAL
SPECIMEN EXP DATE BLD: NORMAL
UNIT DIVISION: 0
WBC # BLD AUTO: 6.9 K/UL (ref 3.6–11)

## 2023-08-10 PROCEDURE — 6370000000 HC RX 637 (ALT 250 FOR IP): Performed by: STUDENT IN AN ORGANIZED HEALTH CARE EDUCATION/TRAINING PROGRAM

## 2023-08-10 PROCEDURE — 2580000003 HC RX 258: Performed by: ANESTHESIOLOGY

## 2023-08-10 PROCEDURE — 85025 COMPLETE CBC W/AUTO DIFF WBC: CPT

## 2023-08-10 PROCEDURE — 36415 COLL VENOUS BLD VENIPUNCTURE: CPT

## 2023-08-10 PROCEDURE — 6370000000 HC RX 637 (ALT 250 FOR IP): Performed by: INTERNAL MEDICINE

## 2023-08-10 PROCEDURE — 3700000001 HC ADD 15 MINUTES (ANESTHESIA): Performed by: INTERNAL MEDICINE

## 2023-08-10 PROCEDURE — A4216 STERILE WATER/SALINE, 10 ML: HCPCS

## 2023-08-10 PROCEDURE — 2580000003 HC RX 258: Performed by: STUDENT IN AN ORGANIZED HEALTH CARE EDUCATION/TRAINING PROGRAM

## 2023-08-10 PROCEDURE — 6360000002 HC RX W HCPCS: Performed by: INTERNAL MEDICINE

## 2023-08-10 PROCEDURE — 6360000002 HC RX W HCPCS: Performed by: ANESTHESIOLOGY

## 2023-08-10 PROCEDURE — 2580000003 HC RX 258

## 2023-08-10 PROCEDURE — 7100000010 HC PHASE II RECOVERY - FIRST 15 MIN: Performed by: INTERNAL MEDICINE

## 2023-08-10 PROCEDURE — 97530 THERAPEUTIC ACTIVITIES: CPT

## 2023-08-10 PROCEDURE — C9113 INJ PANTOPRAZOLE SODIUM, VIA: HCPCS

## 2023-08-10 PROCEDURE — 3600007502: Performed by: INTERNAL MEDICINE

## 2023-08-10 PROCEDURE — 2580000003 HC RX 258: Performed by: INTERNAL MEDICINE

## 2023-08-10 PROCEDURE — 3700000000 HC ANESTHESIA ATTENDED CARE: Performed by: INTERNAL MEDICINE

## 2023-08-10 PROCEDURE — 2500000003 HC RX 250 WO HCPCS: Performed by: ANESTHESIOLOGY

## 2023-08-10 PROCEDURE — 7100000011 HC PHASE II RECOVERY - ADDTL 15 MIN: Performed by: INTERNAL MEDICINE

## 2023-08-10 PROCEDURE — 2709999900 HC NON-CHARGEABLE SUPPLY: Performed by: INTERNAL MEDICINE

## 2023-08-10 PROCEDURE — 0DJD8ZZ INSPECTION OF LOWER INTESTINAL TRACT, VIA NATURAL OR ARTIFICIAL OPENING ENDOSCOPIC: ICD-10-PCS | Performed by: INTERNAL MEDICINE

## 2023-08-10 PROCEDURE — 6360000002 HC RX W HCPCS

## 2023-08-10 PROCEDURE — 0DB98ZX EXCISION OF DUODENUM, VIA NATURAL OR ARTIFICIAL OPENING ENDOSCOPIC, DIAGNOSTIC: ICD-10-PCS | Performed by: INTERNAL MEDICINE

## 2023-08-10 PROCEDURE — 88305 TISSUE EXAM BY PATHOLOGIST: CPT

## 2023-08-10 PROCEDURE — 6370000000 HC RX 637 (ALT 250 FOR IP): Performed by: HOSPITALIST

## 2023-08-10 PROCEDURE — 3600007512: Performed by: INTERNAL MEDICINE

## 2023-08-10 PROCEDURE — 85045 AUTOMATED RETICULOCYTE COUNT: CPT

## 2023-08-10 PROCEDURE — 0DBA8ZX EXCISION OF JEJUNUM, VIA NATURAL OR ARTIFICIAL OPENING ENDOSCOPIC, DIAGNOSTIC: ICD-10-PCS | Performed by: INTERNAL MEDICINE

## 2023-08-10 PROCEDURE — 82962 GLUCOSE BLOOD TEST: CPT

## 2023-08-10 PROCEDURE — 2060000000 HC ICU INTERMEDIATE R&B

## 2023-08-10 RX ORDER — SODIUM CHLORIDE 9 MG/ML
INJECTION, SOLUTION INTRAVENOUS CONTINUOUS
Status: DISCONTINUED | OUTPATIENT
Start: 2023-08-10 | End: 2023-08-10

## 2023-08-10 RX ORDER — PROPOFOL 10 MG/ML
INJECTION, EMULSION INTRAVENOUS PRN
Status: DISCONTINUED | OUTPATIENT
Start: 2023-08-10 | End: 2023-08-10 | Stop reason: SDUPTHER

## 2023-08-10 RX ORDER — SODIUM CHLORIDE 9 MG/ML
25 INJECTION, SOLUTION INTRAVENOUS PRN
Status: DISCONTINUED | OUTPATIENT
Start: 2023-08-10 | End: 2023-08-10 | Stop reason: HOSPADM

## 2023-08-10 RX ORDER — SODIUM CHLORIDE 0.9 % (FLUSH) 0.9 %
5-40 SYRINGE (ML) INJECTION EVERY 12 HOURS SCHEDULED
Status: DISCONTINUED | OUTPATIENT
Start: 2023-08-10 | End: 2023-08-10 | Stop reason: HOSPADM

## 2023-08-10 RX ORDER — SODIUM CHLORIDE 9 MG/ML
INJECTION, SOLUTION INTRAVENOUS CONTINUOUS PRN
Status: DISCONTINUED | OUTPATIENT
Start: 2023-08-10 | End: 2023-08-10 | Stop reason: SDUPTHER

## 2023-08-10 RX ORDER — SODIUM CHLORIDE 0.9 % (FLUSH) 0.9 %
5-40 SYRINGE (ML) INJECTION PRN
Status: DISCONTINUED | OUTPATIENT
Start: 2023-08-10 | End: 2023-08-10 | Stop reason: HOSPADM

## 2023-08-10 RX ORDER — SIMETHICONE 20 MG/.3ML
EMULSION ORAL PRN
Status: DISCONTINUED | OUTPATIENT
Start: 2023-08-10 | End: 2023-08-10 | Stop reason: ALTCHOICE

## 2023-08-10 RX ORDER — LIDOCAINE HYDROCHLORIDE 20 MG/ML
INJECTION, SOLUTION EPIDURAL; INFILTRATION; INTRACAUDAL; PERINEURAL PRN
Status: DISCONTINUED | OUTPATIENT
Start: 2023-08-10 | End: 2023-08-10 | Stop reason: SDUPTHER

## 2023-08-10 RX ADMIN — PROPOFOL 25 MG: 10 INJECTION, EMULSION INTRAVENOUS at 09:44

## 2023-08-10 RX ADMIN — PROPOFOL 25 MG: 10 INJECTION, EMULSION INTRAVENOUS at 09:40

## 2023-08-10 RX ADMIN — PROPOFOL 25 MG: 10 INJECTION, EMULSION INTRAVENOUS at 09:55

## 2023-08-10 RX ADMIN — ACETAMINOPHEN 650 MG: 325 TABLET ORAL at 21:09

## 2023-08-10 RX ADMIN — PROPOFOL 50 MG: 10 INJECTION, EMULSION INTRAVENOUS at 09:34

## 2023-08-10 RX ADMIN — SODIUM CHLORIDE 500 MG: 9 INJECTION, SOLUTION INTRAVENOUS at 08:03

## 2023-08-10 RX ADMIN — SODIUM CHLORIDE 40 MG: 9 INJECTION INTRAMUSCULAR; INTRAVENOUS; SUBCUTANEOUS at 21:09

## 2023-08-10 RX ADMIN — PROPOFOL 25 MG: 10 INJECTION, EMULSION INTRAVENOUS at 09:36

## 2023-08-10 RX ADMIN — CALCIUM 500 MG: 500 TABLET ORAL at 18:21

## 2023-08-10 RX ADMIN — PROPOFOL 25 MG: 10 INJECTION, EMULSION INTRAVENOUS at 09:49

## 2023-08-10 RX ADMIN — PROPOFOL 25 MG: 10 INJECTION, EMULSION INTRAVENOUS at 09:42

## 2023-08-10 RX ADMIN — PROPOFOL 25 MG: 10 INJECTION, EMULSION INTRAVENOUS at 09:48

## 2023-08-10 RX ADMIN — SODIUM CHLORIDE: 9 INJECTION, SOLUTION INTRAVENOUS at 09:32

## 2023-08-10 RX ADMIN — SODIUM CHLORIDE, PRESERVATIVE FREE 10 ML: 5 INJECTION INTRAVENOUS at 08:05

## 2023-08-10 RX ADMIN — SODIUM CHLORIDE, PRESERVATIVE FREE 10 ML: 5 INJECTION INTRAVENOUS at 21:55

## 2023-08-10 RX ADMIN — SODIUM CHLORIDE 25 MG: 9 INJECTION, SOLUTION INTRAVENOUS at 07:16

## 2023-08-10 RX ADMIN — PROPOFOL 25 MG: 10 INJECTION, EMULSION INTRAVENOUS at 09:38

## 2023-08-10 RX ADMIN — LIDOCAINE HYDROCHLORIDE 50 MG: 20 INJECTION, SOLUTION EPIDURAL; INFILTRATION; INTRACAUDAL; PERINEURAL at 09:34

## 2023-08-10 RX ADMIN — PROPOFOL 25 MG: 10 INJECTION, EMULSION INTRAVENOUS at 09:52

## 2023-08-10 RX ADMIN — SODIUM CHLORIDE 40 MG: 9 INJECTION INTRAMUSCULAR; INTRAVENOUS; SUBCUTANEOUS at 12:05

## 2023-08-10 RX ADMIN — PROPOFOL 25 MG: 10 INJECTION, EMULSION INTRAVENOUS at 09:46

## 2023-08-10 ASSESSMENT — PAIN DESCRIPTION - DESCRIPTORS: DESCRIPTORS: ACHING

## 2023-08-10 ASSESSMENT — PAIN SCALES - GENERAL
PAINLEVEL_OUTOF10: 0

## 2023-08-10 ASSESSMENT — PAIN DESCRIPTION - LOCATION: LOCATION: HEAD

## 2023-08-10 ASSESSMENT — PAIN - FUNCTIONAL ASSESSMENT: PAIN_FUNCTIONAL_ASSESSMENT: PREVENTS OR INTERFERES SOME ACTIVE ACTIVITIES AND ADLS

## 2023-08-10 ASSESSMENT — PAIN DESCRIPTION - ORIENTATION: ORIENTATION: MID

## 2023-08-10 ASSESSMENT — PAIN SCALES - WONG BAKER: WONGBAKER_NUMERICALRESPONSE: 0

## 2023-08-10 NOTE — OP NOTE
[address] [place] [address], [address] [place]  [phone]                           Colonoscopy and EGD Procedure Note      Indications:   Anemia       :  Lakisha Galdamez MD    Staff: Circulator: María Sosa RN    Referring Provider: PCP PATIENT FIRST    Sedation:  MAC anesthesia    Procedure Details:  After informed consent was obtained with all risks and benefits of procedure explained and pre-operative exam completed, pt was placed in the left lateral decubitus position. Following sequential administration of sedation as per above, the gastroscope was inserted into the mouth and advanced under direct vision to second portion of the duodenum. A careful inspection was made as the gastroscope was withdrawn, including a retroflexed view of the proximal stomach; findings and interventions are described below. EGD Findings:  Esophagus:GE junction 35 cm from the incisors, large (5 cm) sliding hiatal hernia   Stomach:Non-bleeding linear Julian's erosions within hernia sac, erosive gastritis in antrum - biopsied   Duodenum/jejunum:normal mucosa - biopsied     EGD Interventions:   biopsies     The bed was then turned and upon sequential sedation as per above, a digital rectal exam was performed per below. The Olympus videocolonoscope was inserted in the rectum and carefully advanced to the terminal ileum. The quality of preparation was inadequate. [place] Bowel Prep Score  1/1/1. The colonoscope was slowly withdrawn with careful evaluation between folds. Retroflexion in the rectum was performed.      Colon Findings:   Rectum: semi liquid dark brown stool, normal mucosa   Sigmoid: mild diverticulosis, semi liquid dark brown stool, normal mucosa   Descending Colon: mild diverticulosis, semi liquid dark brown stool, normal mucosa   Transverse Colon: semi liquid dark brown stool, normal mucosa   Ascending Colon: semi liquid dark brown stool, normal mucosa   Cecum: adherent

## 2023-08-10 NOTE — CONSULTS
HEMATOLOGY / ONCOLOGY    [facility] 1950 Age: 74 y.o. Date of service: 08/10/23   Location: [address] [place] [address]  Weakness/fatigue/nausea  --Sudden loss of consciousness, of unknown etiology, 8/7/23  --Positional dizziness? Anemia of unknown etiology  --inappropriately low reticulocyte count. Type II DM  Incidental frontal meningioma  HTN          INTERIM HISTORY AND ASSESSMENT  Ms. Arnold Cifuentes looks good today. She has not had any more syncopal episodes. EGD/colonoscopy was significant for Julian's erosions within hernia sac, erosive gastritis in antrum. That was biopsied, results are pending. She is getting IV iron today. Colon prep was not adequate,  recommends repeat as outpt. I think this patient will benefit from outpatient bone marrow biopsy. We will set her up for an outpt appt with Nick Cruz at [facility] . Per hospitalist notes, likely discharge tomorrow if Hb stable. Recent Labs     08/08/23  0146 08/08/23  0147 08/08/23  0147 08/09/23  0019 08/09/23  0927 08/09/23  1726 08/10/23  0644   WBC  --  8.3  --  9.0  --   --  6.9   HGB  --  8.3*   < > 6.3* 7.0* 9.3* 8.9*   PLT  --  170  --  163  --   --  211   INR 1.0  --   --   --   --   --   --    APTT 21.3*  --   --   --   --   --   --    NA  --  147*  --  145  --   --   --    K  --  3.7  --  3.9  --   --   --    ALT  --  14  --  18  --   --   --     < > = values in this interval not displayed. Past Medical History:   Diagnosis Date    Chronic kidney disease     Diabetes ([address])     Hypertension      Past Surgical History:   Procedure Laterality Date    COLONOSCOPY N/A 8/10/2023    COLONOSCOPY DIAGNOSTIC performed by Thomas Brown MD at [address] [place] [address] N/A 8/10/2023    EGD ESOPHAGOGASTRODUODENOSCOPY performed by Thomas Brown MD at [facility] ENDOSCOPY     Medications and Allergies have been reviewed and updated in the [facility] system.     REVIEW OF SYSTEMS  Except as

## 2023-08-10 NOTE — PROGRESS NOTES
Dr Sydney Barthel at bedside to discuss results with patient, awaiting transport.   Pt A&Ox4, VS normal.

## 2023-08-10 NOTE — PLAN OF CARE
Problem: Occupational Therapy - Adult  Goal: By Discharge: Performs self-care activities at highest level of function for planned discharge setting. See evaluation for individualized goals. Description: FUNCTIONAL STATUS PRIOR TO ADMISSION:  Patient was ambulatory not using AE   , ADL Assistance: Independent,  ,  ,  ,  ,  , Homemaking Assistance: Independent, Ambulation Assistance: Independent, Transfer Assistance: Independent, Active : Yes     HOME SUPPORT: Patient lived with daughter but didn't require assistance. Occupational Therapy Goals:  Initiated 8/7/2023  1. Patient will perform self-feeding with Cooter within 7 day(s). 2.  Patient will perform grooming with Modified Cooter within 7 day(s). 3.  Patient will perform bathing with Modified Cooter within 7 day(s). 4.  Patient will perform toilet transfers with Modified Cooter  within 7 day(s). 5.  Patient will perform all aspects of toileting with Cooter within 7 day(s). 6.  Patient will participate in upper extremity therapeutic exercise/activities with Cooter for 5 minutes within 7 day(s). 7.  Patient will utilize energy conservation techniques during functional activities with verbal cues within 7 day(s). Outcome: Progressing   OCCUPATIONAL THERAPY TREATMENT/DISCHARGE  Patient: Colonel Hurt (60 y.o. female)  Date: 8/10/2023  Primary Diagnosis: Hypomagnesemia [E83.42]  Dizziness [R42]  Brain mass [G93.89]  Symptomatic anemia [D64.9]  Urinary tract infection without hematuria, site unspecified [N39.0]  Other fatigue [R53.83]  Anemia, unspecified type [D64.9]  Procedure(s) (LRB):  EGD ESOPHAGOGASTRODUODENOSCOPY (N/A)  COLONOSCOPY DIAGNOSTIC (N/A) Day of Surgery   Precautions: Fall Risk, Other (comment) (bed and chair alarm)                Chart, occupational therapy assessment, plan of care, and goals were reviewed.     ASSESSMENT  Patient continues to benefit from skilled OT services and is progressing towards goals. Patient received sitting EOB finishing a meal. Patient had a bright affect, appeared relaxed- she had completed her EGD and colonscopy today and was no longer nervous. Patient requested to ambulate in carter- completed >200' with general supervision with no assistive device. Is completing basic ADL with independence. BP remained stable after activity. Anticipate no further OT needs at discharge. PLAN :  Patient continues to benefit from skilled intervention to address the above impairments. Continue treatment per established plan of care to address goals. Recommend with staff: enable ADL independence     Recommendation for discharge: (in order for the patient to meet his/her long term goals): No skilled occupational therapy    Other factors to consider for discharge: no additional factors    IF patient discharges home will need the following DME: none       SUBJECTIVE:   Patient pleasant and cooperative     OBJECTIVE DATA SUMMARY:   Cognitive/Behavioral Status:          Functional Mobility and Transfers for ADLs:  Bed Mobility:  Bed Mobility Training  Supine to Sit: Independent  Sit to Supine: Independent  Scooting: Independent     Transfers:   Transfer Training  Sit to Stand: Independent  Stand to Sit: Independent  Toilet Transfer:  (supervision during ambulation with no AD. into carter >200')      Balance:      Intact sitting  Intact static standing, occasional dynamic- able to self correct any mild postural sway. ADL Intervention:         Feeding: Independent         Grooming: Independent        UE Bathing: Independent       LE Bathing: Independent       UE Dressing: Independent       LE Dressing: Independent       Toileting: Independent                      Pain Ratin/10   Pain Intervention(s): Activity Tolerance:   Good and BP stable   Please refer to the flowsheet for vital signs taken during this treatment.     After treatment:   Patient left in no

## 2023-08-10 NOTE — PROGRESS NOTES
Output --   Net 5858.75 ml          Physical Examination:     I had a face to face encounter with this patient and independently examined them on 8/10/2023 as outlined below:          General : alert x 3, awake, no acute distress,   HEENT: PEERL, EOMI, moist mucus membrane, pale conjunctiva  Neck: supple, no JVD, no meningeal signs  Chest: Clear to auscultation bilaterally   CVS: rrr  Abd: soft/ non tender, non distended, BS physiological,   Ext: no clubbing, no cyanosis, no edema, brisk 2+ DP pulses  Neuro/Psych: grossly non-focal  Skin: warm     Data Review:    Review and/or order of clinical lab test  Review and/or order of tests in the radiology section of CPT  Review and/or order of tests in the medicine section of CPT      I have personally and independently reviewed all pertinent labs, diagnostic studies, imaging, and have provided independent interpretation of the same. Labs:     Recent Labs     08/09/23  0019 08/09/23  0927 08/09/23  1726 08/10/23  0644   WBC 9.0  --   --  6.9   HGB 6.3*   < > 9.3* 8.9*   HCT 20.0*   < > 28.3* 26.9*     --   --  211    < > = values in this interval not displayed. Recent Labs     08/07/23  1647 08/08/23  0147 08/09/23  0019    147* 145   K 4.4 3.7 3.9   * 120* 118*   CO2 22 23 23   BUN 61* 48* 36*       Recent Labs     08/08/23  0147 08/09/23  0019   ALT 14 18   GLOB 2.2 2.0       Recent Labs     08/08/23  0146   INR 1.0   APTT 21.3*        No results for input(s): TIBC, FERR in the last 72 hours. Invalid input(s): FE, PSAT     No results found for: FOL, RBCF   No results for input(s): PH, PCO2, PO2 in the last 72 hours. No results for input(s): CPK in the last 72 hours. Invalid input(s): CPKMB, CKNDX, TROIQ  No results found for: CHOL, CHOLX, CHLST, CHOLV, HDL, HDLC, LDL, LDLC, TGLX, TRIGL  No results found for: GLUCPOC  [unfilled]    Notes reviewed from all clinical/nonclinical/nursing services involved in patient's clinical care.  Care

## 2023-08-10 NOTE — PROGRESS NOTES
PHYSICAL THERAPY 8/10/2023    Chart reviewed and attempted session. Patient currently off unit for planned colonoscopy and EGD. Will continue to follow and attempt as able.      Bernardo Agrawal, PT

## 2023-08-10 NOTE — ANESTHESIA POSTPROCEDURE EVALUATION
Department of Anesthesiology  Postprocedure Note    Patient: Reid Koo  MRN: [id number]  YOB: 1950  Date of evaluation: 8/10/2023      Procedure Summary     Date: 08/10/23 Room / Location: [address] ENDO 04 / [address] ENDOSCOPY    Anesthesia Start: 0932 Anesthesia Stop: 1000    Procedures:       EGD ESOPHAGOGASTRODUODENOSCOPY      COLONOSCOPY DIAGNOSTIC Diagnosis:       Anemia, unspecified type      Hematochezia      (Anemia, unspecified type [D64.9])      (Hematochezia [K92.1])    Surgeons: Kartik Watson MD Responsible Provider: Tyrel Stark MD    Anesthesia Type: MAC ASA Status: 3          Anesthesia Type: MAC    Petty Phase I: Petty Score: 10    Petty Phase II: Petty Score: 9      Anesthesia Post Evaluation    Patient location during evaluation: PACU  Patient participation: complete - patient participated  Level of consciousness: awake  Airway patency: patent  Nausea & Vomiting: no nausea  Complications: no  Cardiovascular status: blood pressure returned to baseline and hemodynamically stable  Respiratory status: acceptable  Hydration status: stable  Multimodal analgesia pain management approach

## 2023-08-10 NOTE — PROGRESS NOTES
[facility]  1950  [id number]    Situation:  Verbal report received from: Carlos Ward RN  Procedure: Procedure(s):  EGD ESOPHAGOGASTRODUODENOSCOPY  COLONOSCOPY DIAGNOSTIC    Background:    Preoperative diagnosis: Anemia, unspecified type [D64.9]  Hematochezia [K92.1]  :  Dr. Sarah Paul  Assistant(s): Circulator: Carlos Ward RN    Specimens:   ID Type Source Tests Collected by Time Destination   A : Duodenal bx Tissue Duodenum SURGICAL PATHOLOGY Desmond Kingston MD 8/10/2023 5604    B : Gastric bx Tissue Gastric SURGICAL PATHOLOGY Desmond Kingston MD 8/10/2023 8107        Assessment:  Intra-procedure medications   Anesthesia gave intra-procedure sedation and medications, see anesthesia flow sheet     Intravenous fluids: NS@ KVO     Vital signs stable     Abdominal assessment: round and soft     Recommendation:  Discharge patient per MD order.   Return to floor- 442

## 2023-08-10 NOTE — PROGRESS NOTES

## 2023-08-10 NOTE — CARE COORDINATION
Care Management Initial Assessment       RUR:  17%  Readmission? no  1st IM letter given? yes  1st  letter given: n/a     08/10/23 1817   Service Assessment   Patient Orientation Alert and Oriented   Cognition Alert   History Provided By Patient   Primary Caregiver [address]    PCP Verified by CM Yes   Last Visit to PCP Within last 6 months   Prior Functional Level Independent in ADLs/IADLs   Can patient return to prior living arrangement Yes   Ability to make needs known: Good   Family able to assist with home care needs: No   Would you like for me to discuss the discharge plan with any other family members/significant others, and if so, who? No   Social/Functional History   Type of Home House     This CM met with patient and explained CM role in support of dc planning and transition of care needs. Patient explained that she lives alone and has supportive daughter. Patient explained that she does not use any DME and drives to all appointments and gets medications at [facility] on [address] in [place]. Family can drive home at dc. Patient stated that she does not feel like she will need [facility] services at this point. Daughter- Kolleen Romberg- [phone] can transport at dc.      KOSTAS Soto  6:17 PM

## 2023-08-10 NOTE — H&P
[address]  [phone]                                History and Physical     NAME: Nadeen Markham   :  1950   MRN:  [id number]     HPI:  The patient was seen and examined. History reviewed. No pertinent surgical history. Past Medical History:   Diagnosis Date    Chronic kidney disease     Diabetes (HCC)     Hypertension         Allergies   Allergen Reactions    Penicillins     Doxycycline Rash     History reviewed. No pertinent family history.   Current Facility-Administered Medications   Medication Dose Route Frequency    lactated ringers IV soln infusion   IntraVENous Continuous    iron dextran complex (INFED) 500 mg in sodium chloride 0.9 % 500 mL IVPB  500 mg IntraVENous Once    sodium chloride flush 0.9 % injection 5-40 mL  5-40 mL IntraVENous 2 times per day    sodium chloride flush 0.9 % injection 5-40 mL  5-40 mL IntraVENous PRN    0.9 % sodium chloride infusion   IntraVENous PRN    [Held by provider] ondansetron (ZOFRAN-ODT) disintegrating tablet 4 mg  4 mg Oral Q8H PRN    Or    [Held by provider] ondansetron (ZOFRAN) injection 4 mg  4 mg IntraVENous Q6H PRN    polyethylene glycol (GLYCOLAX) packet 17 g  17 g Oral Daily PRN    acetaminophen (TYLENOL) tablet 650 mg  650 mg Oral Q6H PRN    Or    acetaminophen (TYLENOL) suppository 650 mg  650 mg Rectal Q6H PRN    lidocaine (LMX) 4 % cream   Topical PRN    glucose chewable tablet 16 g  4 tablet Oral PRN    dextrose bolus 10% 125 mL  125 mL IntraVENous PRN    Or    dextrose bolus 10% 250 mL  250 mL IntraVENous PRN    glucagon injection 1 mg  1 mg SubCUTAneous PRN    dextrose 10 % infusion   IntraVENous Continuous PRN    insulin lispro (HUMALOG) injection vial 0-4 Units  0-4 Units SubCUTAneous TID WC    insulin lispro (HUMALOG) injection vial 0-4 Units  0-4 Units SubCUTAneous Nightly    calcium elemental (OSCAL) tablet 500 mg  500 mg Oral QPM    pantoprazole (PROTONIX) 40 mg in

## 2023-08-10 NOTE — PERIOP NOTE
TRANSFER - IN REPORT:    Verbal report received from [facility], [address] [place] [address] on Cari Old  being received from  for ordered procedure      Information from the following report(s) Adult Overview, MAR, Pre Procedure Checklist, Procedure Verification, and Quality Measures was reviewed with the receiving nurse. Opportunity for questions and clarification was provided.

## 2023-08-11 LAB
ALBUMIN SERPL-MCNC: 2.4 G/DL (ref 3.5–5)
ALBUMIN/GLOB SERPL: 1.2 (ref 1.1–2.2)
ALP SERPL-CCNC: 33 U/L (ref 45–117)
ALT SERPL-CCNC: 16 U/L (ref 12–78)
ANION GAP SERPL CALC-SCNC: 6 MMOL/L (ref 5–15)
AST SERPL-CCNC: 15 U/L (ref 15–37)
BASOPHILS # BLD: 0 K/UL (ref 0–0.1)
BASOPHILS NFR BLD: 1 % (ref 0–1)
BILIRUB SERPL-MCNC: 0.3 MG/DL (ref 0.2–1)
BUN SERPL-MCNC: 15 MG/DL (ref 6–20)
BUN/CREAT SERPL: 23 (ref 12–20)
CALCIUM SERPL-MCNC: 8 MG/DL (ref 8.5–10.1)
CHLORIDE SERPL-SCNC: 117 MMOL/L (ref 97–108)
CO2 SERPL-SCNC: 22 MMOL/L (ref 21–32)
CREAT SERPL-MCNC: 0.65 MG/DL (ref 0.55–1.02)
DIFFERENTIAL METHOD BLD: ABNORMAL
EOSINOPHIL # BLD: 0.1 K/UL (ref 0–0.4)
EOSINOPHIL NFR BLD: 1 % (ref 0–7)
ERYTHROCYTE [DISTWIDTH] IN BLOOD BY AUTOMATED COUNT: 15.9 % (ref 11.5–14.5)
GLOBULIN SER CALC-MCNC: 2 G/DL (ref 2–4)
GLUCOSE BLD STRIP.AUTO-MCNC: 115 MG/DL (ref 65–117)
GLUCOSE BLD STRIP.AUTO-MCNC: 120 MG/DL (ref 65–117)
GLUCOSE BLD STRIP.AUTO-MCNC: 121 MG/DL (ref 65–117)
GLUCOSE BLD STRIP.AUTO-MCNC: 133 MG/DL (ref 65–117)
GLUCOSE SERPL-MCNC: 97 MG/DL (ref 65–100)
HCT VFR BLD AUTO: 24.3 % (ref 35–47)
HGB BLD-MCNC: 7.8 G/DL (ref 11.5–16)
IMM GRANULOCYTES # BLD AUTO: 0 K/UL (ref 0–0.04)
IMM GRANULOCYTES NFR BLD AUTO: 0 % (ref 0–0.5)
LYMPHOCYTES # BLD: 2.3 K/UL (ref 0.8–3.5)
LYMPHOCYTES NFR BLD: 32 % (ref 12–49)
MCH RBC QN AUTO: 29.8 PG (ref 26–34)
MCHC RBC AUTO-ENTMCNC: 32.1 G/DL (ref 30–36.5)
MCV RBC AUTO: 92.7 FL (ref 80–99)
MONOCYTES # BLD: 0.5 K/UL (ref 0–1)
MONOCYTES NFR BLD: 8 % (ref 5–13)
NEUTS SEG # BLD: 4.3 K/UL (ref 1.8–8)
NEUTS SEG NFR BLD: 58 % (ref 32–75)
NRBC # BLD: 0 K/UL (ref 0–0.01)
NRBC BLD-RTO: 0 PER 100 WBC
PLATELET # BLD AUTO: 201 K/UL (ref 150–400)
PMV BLD AUTO: 9.9 FL (ref 8.9–12.9)
POTASSIUM SERPL-SCNC: 3.7 MMOL/L (ref 3.5–5.1)
PROT SERPL-MCNC: 4.4 G/DL (ref 6.4–8.2)
RBC # BLD AUTO: 2.62 M/UL (ref 3.8–5.2)
RETICS # AUTO: 0.12 M/UL (ref 0.02–0.08)
RETICS/RBC NFR AUTO: 4.6 % (ref 0.7–2.1)
SERVICE CMNT-IMP: ABNORMAL
SERVICE CMNT-IMP: NORMAL
SODIUM SERPL-SCNC: 145 MMOL/L (ref 136–145)
WBC # BLD AUTO: 7.2 K/UL (ref 3.6–11)

## 2023-08-11 PROCEDURE — A4216 STERILE WATER/SALINE, 10 ML: HCPCS

## 2023-08-11 PROCEDURE — C9113 INJ PANTOPRAZOLE SODIUM, VIA: HCPCS

## 2023-08-11 PROCEDURE — 6370000000 HC RX 637 (ALT 250 FOR IP): Performed by: STUDENT IN AN ORGANIZED HEALTH CARE EDUCATION/TRAINING PROGRAM

## 2023-08-11 PROCEDURE — 2580000003 HC RX 258

## 2023-08-11 PROCEDURE — 85025 COMPLETE CBC W/AUTO DIFF WBC: CPT

## 2023-08-11 PROCEDURE — 82962 GLUCOSE BLOOD TEST: CPT

## 2023-08-11 PROCEDURE — 6360000002 HC RX W HCPCS: Performed by: INTERNAL MEDICINE

## 2023-08-11 PROCEDURE — 85045 AUTOMATED RETICULOCYTE COUNT: CPT

## 2023-08-11 PROCEDURE — 2580000003 HC RX 258: Performed by: STUDENT IN AN ORGANIZED HEALTH CARE EDUCATION/TRAINING PROGRAM

## 2023-08-11 PROCEDURE — 6360000002 HC RX W HCPCS

## 2023-08-11 PROCEDURE — 80053 COMPREHEN METABOLIC PANEL: CPT

## 2023-08-11 PROCEDURE — 1200000000 HC SEMI PRIVATE

## 2023-08-11 PROCEDURE — 2580000003 HC RX 258: Performed by: INTERNAL MEDICINE

## 2023-08-11 PROCEDURE — 36415 COLL VENOUS BLD VENIPUNCTURE: CPT

## 2023-08-11 RX ADMIN — SODIUM CHLORIDE 500 MG: 9 INJECTION, SOLUTION INTRAVENOUS at 09:11

## 2023-08-11 RX ADMIN — SODIUM CHLORIDE 40 MG: 9 INJECTION INTRAMUSCULAR; INTRAVENOUS; SUBCUTANEOUS at 22:34

## 2023-08-11 RX ADMIN — ACETAMINOPHEN 650 MG: 325 TABLET ORAL at 22:20

## 2023-08-11 RX ADMIN — SODIUM CHLORIDE 40 MG: 9 INJECTION INTRAMUSCULAR; INTRAVENOUS; SUBCUTANEOUS at 10:32

## 2023-08-11 RX ADMIN — SODIUM CHLORIDE, PRESERVATIVE FREE 10 ML: 5 INJECTION INTRAVENOUS at 09:04

## 2023-08-11 RX ADMIN — ACETAMINOPHEN 650 MG: 325 TABLET ORAL at 08:54

## 2023-08-11 ASSESSMENT — PAIN DESCRIPTION - DESCRIPTORS
DESCRIPTORS: SQUEEZING
DESCRIPTORS: SQUEEZING

## 2023-08-11 ASSESSMENT — PAIN SCALES - GENERAL
PAINLEVEL_OUTOF10: 2
PAINLEVEL_OUTOF10: 1
PAINLEVEL_OUTOF10: 7
PAINLEVEL_OUTOF10: 0

## 2023-08-11 ASSESSMENT — PAIN - FUNCTIONAL ASSESSMENT
PAIN_FUNCTIONAL_ASSESSMENT: ACTIVITIES ARE NOT PREVENTED
PAIN_FUNCTIONAL_ASSESSMENT: ACTIVITIES ARE NOT PREVENTED

## 2023-08-11 ASSESSMENT — PAIN DESCRIPTION - LOCATION
LOCATION: HEAD
LOCATION: HEAD

## 2023-08-11 NOTE — PLAN OF CARE
Problem: Discharge Planning  Goal: Discharge to home or other facility with appropriate resources  8/11/2023 0059 by Kristina Allen RN  Outcome: Progressing    Problem: Pain  Goal: Verbalizes/displays adequate comfort level or baseline comfort level  8/11/2023 0059 by Kristina Allen RN  Outcome: Progressing    Problem: Skin/Tissue Integrity  Goal: Absence of new skin breakdown  Description: 1. Monitor for areas of redness and/or skin breakdown  2. Assess vascular access sites hourly  3. Every 4-6 hours minimum:  Change oxygen saturation probe site  4. Every 4-6 hours:  If on nasal continuous positive airway pressure, respiratory therapy assess nares and determine need for appliance change or resting period.   8/11/2023 0059 by Kristina Allen RN  Outcome: Progressing    Problem: Safety - Adult  Goal: Free from fall injury  8/11/2023 0059 by Kristina Allen RN  Outcome: Progressing

## 2023-08-11 NOTE — PROGRESS NOTES
HEMATOLOGY / ONCOLOGY    Shira Fernandez 1950 Age: 74 y.o. Date of service: 08/11/23   Location: [address] [place] [address]  Weakness/fatigue/nausea  --Sudden loss of consciousness, of unknown etiology, 8/7/23  --Positional dizziness? Anemia of unknown etiology  --inappropriately low reticulocyte count. Type II DM  Incidental frontal meningioma  HTN          INTERIM HISTORY AND ASSESSMENT  Ms. Anthony Wright continues to have drop in her Hb. She is getting more iron dextran today. I am concerned that there is occult bleeding going on. If her Hb continues to drop, we may need to consider inpatient colonoscopy. The other option would be to get a CT angiogram, but I doubt that will show anything significant. We will continue to follow Hb trend daily. I discussed the case with Yuliya Jones, and there could be some hemodilution also going on. We will trend Hb daily. [facility] team will continue to follow         Recent Labs     08/09/23  0019 08/09/23  0927 08/09/23  1726 08/10/23  0644 08/11/23  0300   WBC 9.0  --   --  6.9 7.2   HGB 6.3* 7.0* 9.3* 8.9* 7.8*     --   --  211 201     --   --   --  145   K 3.9  --   --   --  3.7   ALT 18  --   --   --  16          Past Medical History:   Diagnosis Date    Chronic kidney disease     Diabetes ([facility])     Hypertension      Past Surgical History:   Procedure Laterality Date    COLONOSCOPY N/A 8/10/2023    COLONOSCOPY DIAGNOSTIC performed by Yarely Diop MD at [facility] N/A 8/10/2023    EGD ESOPHAGOGASTRODUODENOSCOPY performed by Yarely Diop MD at [address] [place] [address] ENDOSCOPY     Medications and Allergies have been reviewed and updated in the [facility] system. REVIEW OF SYSTEMS  Except as noted in the history and assessement above, all other systems are negative. EXAMINATION   Vital signs were reviewed abnormalities discussed above. General: Looks well.    HEENT: conjunctiva clear; no jaundice    Cervical nodes: none palpable  Supraclavicular nodes: none palpable  Axillary nodes: none palpable  Inguinal nodes: none palpable  Lungs: clear  CV: RRR  Abd: soft and non-tender; no HSM; no masses  Skin: no significant rashes  Ext: Edema: none; Tenderness: none  Neuro/Psych:  Gait: too weak  Speech: NL   Affect: NL  Cognition: NL   See history and assessment above for further details of mental status. Social History     Tobacco Use    Smoking status: Not on file    Smokeless tobacco: Not on file   Substance Use Topics    Alcohol use: Not on file      History reviewed. No pertinent family history.      Peace Gonsalves MD

## 2023-08-11 NOTE — PROGRESS NOTES
[address] [facility]  Hospitalist Group                                                                                          Hospitalist Progress Note  Shaun Underwood MD  Office Phone: [phone]        Date of Service:  2023  NAME:  Nilda Moore  :  1950  MRN:  [id number]       Admission Summary:   Nilda Moore is a 74 y.o. female with history of hypertension, diabetes, iron deficiency anemia, CKD who is admitted for symptomatic anemia. Interval history / Subjective:   Patient is concerned about her feet being extremely swollen, she is concerned that it's her kidneys. Her Creatinine is now normalized, so there is no reason to think her kidneys are not working properly. The swelling is most likely due to fluids/ transfusions, and immobility. I encouraged her to walk around and avoid sitting for extended periods of time, elevate feet when sitting. Patient is also concerned about her bladder, recommend outpatient follow-up with Urology. Hgb is 7.8 this morning, down from 8.9 yesterday.       Assessment & Plan:     Anemia: now w/ suspected acute GI blood loss  -history of AMOS per the pt however iron studies not c/w this, may be due to acute bleeding not reflecting iron-def yet  -B12, folate ok, no evidence of hemolysis, however retics not appropriately elevated  -check stool for occult blood - positive, stool is brown but RN noted some red blood mixed in  -hematology recs noted  -s/p 4U PRBCs, last 88 PM, transfuse additional unit today  -continue IV PPI  -consult GI - plan for EGD/colonoscopy tomorrow 8/10  - EGD 8/10:   Esophagus:GE junction 35 cm from the incisors, large (5 cm) sliding hiatal hernia   Stomach:Non-bleeding linear Julian's erosions within hernia sac, erosive gastritis in antrum - biopsied   Duodenum/jejunum:normal mucosa - biopsied   -Recommend PPI BID;    - Colonoscopy 8/10:   mild diverticulosis of the sigmoid and descending colon, otherwise

## 2023-08-11 NOTE — PROGRESS NOTES
Bedside and Verbal shift change report given to Aida RN (oncoming nurse) by Kaylie Vargas RN (offgoing nurse). Report included the following information Nurse Handoff Report, ED SBAR, Intake/Output, MAR, Recent Results, and Cardiac Rhythm NSR . Latest hgb is 7.8 today; for possible home today.

## 2023-08-11 NOTE — PLAN OF CARE
Problem: Discharge Planning  Goal: Discharge to home or other facility with appropriate resources  8/11/2023 1200 by David Kilgore RN  Outcome: Progressing  Flowsheets (Taken 8/11/2023 0800)  Discharge to home or other facility with appropriate resources: Identify barriers to discharge with patient and caregiver  8/11/2023 0059 by Charlotte Haley RN  Outcome: Progressing     Problem: Pain  Goal: Verbalizes/displays adequate comfort level or baseline comfort level  8/11/2023 1200 by David Kilgore RN  Outcome: Progressing  8/11/2023 0059 by Charlotte Haley RN  Outcome: Progressing     Problem: Skin/Tissue Integrity  Goal: Absence of new skin breakdown  Description: 1. Monitor for areas of redness and/or skin breakdown  2. Assess vascular access sites hourly  3. Every 4-6 hours minimum:  Change oxygen saturation probe site  4. Every 4-6 hours:  If on nasal continuous positive airway pressure, respiratory therapy assess nares and determine need for appliance change or resting period.   8/11/2023 1200 by David Kilgore RN  Outcome: Progressing  8/11/2023 0059 by Charlotte Haley RN  Outcome: Progressing     Problem: Safety - Adult  Goal: Free from fall injury  8/11/2023 1200 by David Kilgore RN  Outcome: Progressing  8/11/2023 0059 by Charlotte Haley RN  Outcome: Progressing     Problem: Chronic Conditions and Co-morbidities  Goal: Patient's chronic conditions and co-morbidity symptoms are monitored and maintained or improved  Outcome: Progressing  Flowsheets (Taken 8/11/2023 0800)  Care Plan - Patient's Chronic Conditions and Co-Morbidity Symptoms are Monitored and Maintained or Improved: Monitor and assess patient's chronic conditions and comorbid symptoms for stability, deterioration, or improvement     Problem: Nutrition Deficit:  Goal: Optimize nutritional status  Outcome: Progressing

## 2023-08-11 NOTE — PROGRESS NOTES
Physician Progress Note      PATIENT:               Brandee Tovar  CSN #:                  [id number]  :                       1950  ADMIT DATE:       2023 8:44 PM  [facility] DATE:  RESPONDING  PROVIDER #:        Vick Espinosa MD          QUERY TEXT:    Patient admitted with GI bleeding, noted to have Hiatal hernia and erosive   gastritis documented per EGD. If possible, please document in progress notes   and discharge summary the cause of the GI bleeding: The medical record reflects the following:  Risk Factors: Suspected GIB, AMOS    Clinical Indicators: Hgb initially 5.6,-->8.4--> 5.9, -->8.9-->7.3, occult   blood - positive,  - EGD 8/10:  Esophagus: GE junction 35 cm from the incisors, large (5 cm) sliding hiatal   hernia? Stomach: Non-bleeding linear Julian's erosions within hernia sac, erosive   gastritis in antrum - biopsied? Treatment: -s/p 4U PRBCs, last  PM, transfuse additional unit today  -continue IV PPI  -consult GI - plan for EGD/colonoscopy tomorrow 8/10  - EGD 8/10:  Options provided:  -- GI bleeding due to colon erosive gastritis  -- GI bleeding due to hiatal hernia  -- Other - I will add my own diagnosis  -- Disagree - Not applicable / Not valid  -- Disagree - Clinically unable to determine / Unknown  -- Refer to Clinical Documentation Reviewer    PROVIDER RESPONSE TEXT:    GI bleed due to erosive gastritis;    Query created by:  Jose Luis Bills on 2023 3:23 PM      Electronically signed by:  Vick Espinosa MD 2023 5:49 PM

## 2023-08-11 NOTE — PROGRESS NOTES
PHYSICAL THERAPY 8/11/2023    Chart reviewed and attempted session. Pt politely declining due to reports of swelling in B feet + \"head feeling like it is going to pop\". Pt encouraged to mobilize with RN and RW when feeling better to continue to prevent deconditioning while admitted. Will continue to follow and attempt as able.      Rosenda Conley, PT

## 2023-08-12 VITALS
TEMPERATURE: 98.4 F | SYSTOLIC BLOOD PRESSURE: 113 MMHG | WEIGHT: 102.95 LBS | RESPIRATION RATE: 17 BRPM | HEART RATE: 69 BPM | DIASTOLIC BLOOD PRESSURE: 62 MMHG | BODY MASS INDEX: 18.24 KG/M2 | OXYGEN SATURATION: 96 % | HEIGHT: 63 IN

## 2023-08-12 LAB
ANION GAP SERPL CALC-SCNC: 4 MMOL/L (ref 5–15)
BASOPHILS # BLD: 0.1 K/UL (ref 0–0.1)
BASOPHILS NFR BLD: 1 % (ref 0–1)
BUN SERPL-MCNC: 19 MG/DL (ref 6–20)
BUN/CREAT SERPL: 26 (ref 12–20)
CALCIUM SERPL-MCNC: 8.1 MG/DL (ref 8.5–10.1)
CHLORIDE SERPL-SCNC: 116 MMOL/L (ref 97–108)
CO2 SERPL-SCNC: 23 MMOL/L (ref 21–32)
CREAT SERPL-MCNC: 0.74 MG/DL (ref 0.55–1.02)
DIFFERENTIAL METHOD BLD: ABNORMAL
EOSINOPHIL # BLD: 0.1 K/UL (ref 0–0.4)
EOSINOPHIL NFR BLD: 1 % (ref 0–7)
ERYTHROCYTE [DISTWIDTH] IN BLOOD BY AUTOMATED COUNT: 17 % (ref 11.5–14.5)
GLUCOSE BLD STRIP.AUTO-MCNC: 128 MG/DL (ref 65–117)
GLUCOSE BLD STRIP.AUTO-MCNC: 91 MG/DL (ref 65–117)
GLUCOSE SERPL-MCNC: 119 MG/DL (ref 65–100)
HCT VFR BLD AUTO: 25.3 % (ref 35–47)
HGB BLD-MCNC: 8 G/DL (ref 11.5–16)
IMM GRANULOCYTES # BLD AUTO: 0 K/UL (ref 0–0.04)
IMM GRANULOCYTES NFR BLD AUTO: 0 % (ref 0–0.5)
LYMPHOCYTES # BLD: 2 K/UL (ref 0.8–3.5)
LYMPHOCYTES NFR BLD: 29 % (ref 12–49)
MCH RBC QN AUTO: 30.4 PG (ref 26–34)
MCHC RBC AUTO-ENTMCNC: 31.6 G/DL (ref 30–36.5)
MCV RBC AUTO: 96.2 FL (ref 80–99)
MONOCYTES # BLD: 0.5 K/UL (ref 0–1)
MONOCYTES NFR BLD: 8 % (ref 5–13)
NEUTS SEG # BLD: 4.1 K/UL (ref 1.8–8)
NEUTS SEG NFR BLD: 61 % (ref 32–75)
NRBC # BLD: 0 K/UL (ref 0–0.01)
NRBC BLD-RTO: 0 PER 100 WBC
PLATELET # BLD AUTO: 216 K/UL (ref 150–400)
PMV BLD AUTO: 10 FL (ref 8.9–12.9)
POTASSIUM SERPL-SCNC: 3.6 MMOL/L (ref 3.5–5.1)
RBC # BLD AUTO: 2.63 M/UL (ref 3.8–5.2)
SERVICE CMNT-IMP: ABNORMAL
SERVICE CMNT-IMP: NORMAL
SODIUM SERPL-SCNC: 143 MMOL/L (ref 136–145)
WBC # BLD AUTO: 6.8 K/UL (ref 3.6–11)

## 2023-08-12 PROCEDURE — 6370000000 HC RX 637 (ALT 250 FOR IP): Performed by: INTERNAL MEDICINE

## 2023-08-12 PROCEDURE — 85025 COMPLETE CBC W/AUTO DIFF WBC: CPT

## 2023-08-12 PROCEDURE — 82962 GLUCOSE BLOOD TEST: CPT

## 2023-08-12 PROCEDURE — 36415 COLL VENOUS BLD VENIPUNCTURE: CPT

## 2023-08-12 PROCEDURE — 80048 BASIC METABOLIC PNL TOTAL CA: CPT

## 2023-08-12 RX ORDER — ASCORBIC ACID 500 MG
250 TABLET ORAL
Qty: 30 TABLET | Refills: 0 | Status: SHIPPED | OUTPATIENT
Start: 2023-08-12

## 2023-08-12 RX ORDER — PANTOPRAZOLE SODIUM 40 MG/1
40 TABLET, DELAYED RELEASE ORAL EVERY 12 HOURS
Qty: 60 TABLET | Refills: 0 | Status: SHIPPED | OUTPATIENT
Start: 2023-08-12 | End: 2023-09-11

## 2023-08-12 RX ORDER — FERROUS SULFATE 325(65) MG
325 TABLET ORAL
Qty: 30 TABLET | Refills: 0 | Status: SHIPPED | OUTPATIENT
Start: 2023-08-12

## 2023-08-12 RX ORDER — PANTOPRAZOLE SODIUM 40 MG/1
40 TABLET, DELAYED RELEASE ORAL EVERY 12 HOURS
Status: DISCONTINUED | OUTPATIENT
Start: 2023-08-12 | End: 2023-08-12 | Stop reason: HOSPADM

## 2023-08-12 RX ORDER — PANTOPRAZOLE SODIUM 40 MG/1
40 TABLET, DELAYED RELEASE ORAL
Status: DISCONTINUED | OUTPATIENT
Start: 2023-08-12 | End: 2023-08-12

## 2023-08-12 RX ORDER — CALCIUM CARBONATE 500(1250)
500 TABLET ORAL EVERY EVENING
Qty: 30 TABLET | Refills: 0 | Status: SHIPPED | OUTPATIENT
Start: 2023-08-12

## 2023-08-12 RX ADMIN — PANTOPRAZOLE SODIUM 40 MG: 40 TABLET, DELAYED RELEASE ORAL at 11:57

## 2023-08-12 NOTE — PROGRESS NOTES
NURSING DISCHARGE NOTE    Ms Hitesh Whaley is a 74year old female transferred 8/11 from  . She was originally admitted for symptomatic anemia. The patient arrived on the unit 8/11 at 1600 and had Iron Gluconate running at 127 mL/hr. Patient was to be observed overnight, and if her hgb remained stable patient would be discharged per MD.      She is alert and oriented x4, up ad santos with no assist but has been using a walker while in the hospital.  She denies pain at this time and is on room air. 2 PIVS were removed. Discharge paperwork was printed and explained to patient, she was educated on new prescriptions. Patient had several questions for both Dr. Javier Laureano and the RN. Questions were answered. Prescriptions sent to her pharmacy on file. Patient 's daughter will be her transport home. She has called her and stated she will be arriving at 1600 hrs. She will be transported by wheelchair down. Patient to follow up with her PCP for further healthcare needs.        DEVORA

## 2023-08-12 NOTE — DISCHARGE SUMMARY
Discharge Summary       PATIENT ID: Abigail Bernard  MRN: [id number]   YOB: 1950    DATE OF ADMISSION: 8/6/2023  8:44 PM    DATE OF DISCHARGE: 8/12/2023  PRIMARY CARE PROVIDER: [facility]     DISCHARGING PROVIDER: Ester Gould MD    To contact this individual call [phone] and ask the  to page. If unavailable ask to be transferred the Adult Hospitalist Department. CONSULTATIONS: IP CONSULT TO HOSPITALIST  IP CONSULT TO HEMATOLOGY  IP CONSULT TO DIETITIAN  IP CONSULT TO GI    PROCEDURES/SURGERIES: Procedure(s):  EGD ESOPHAGOGASTRODUODENOSCOPY  COLONOSCOPY DIAGNOSTIC     ADMITTING DIAGNOSES & HOSPITAL COURSE:   HPI:  Abigail Bernard is a 74 y.o. female with history of hypertension, diabetes, iron deficiency anemia, CKD who was admitted with symptomatic anemia.      HOSPITAL COURSE:  Anemia: now w/ suspected acute GI blood loss  -history of AMOS per the pt however iron studies not c/w this, may be due to acute bleeding not reflecting iron-def yet  -B12, folate ok, no evidence of hemolysis, however retics not appropriately elevated  -check stool for occult blood - positive, stool is brown but RN noted some red blood mixed in  -hematology recs noted  -s/p 4U PRBCs, last 8/8 PM, transfuse additional unit today  -continue IV PPI  -consult GI - plan for EGD/colonoscopy tomorrow 8/10  - EGD 8/10:   Esophagus:GE junction 35 cm from the incisors, large (5 cm) sliding hiatal hernia   Stomach:Non-bleeding linear Julian's erosions within hernia sac, erosive gastritis in antrum - biopsied   Duodenum/jejunum:normal mucosa - biopsied   -Recommend PPI BID;     - Colonoscopy 8/10:   mild diverticulosis of the sigmoid and descending colon, otherwise normal mucosa; inadequate prep; recommend repeat colonoscopy as outpatient;  - continue to monitor H&H;  - 8/11: Hgb is 7.8 this morning, down from 8.9 yesterday;   - Patient is to follow-up with GI as outpatient for repeat colonoscopy, she has

## 2023-08-12 NOTE — PROGRESS NOTES
The pt was found IV infiltration from left forearm around 11pm 08/11, and left forearm is supper swollen,but the pt denies any s/s, such as itching or pain. RN stopped the iron infusion, and started a new IV on Right AC site. NP paged about this condition, and per NP, RN called pharmacy to discussion about the iv infiltration and iron infusion. Per pharmacy, iron bag is ok to given in 24 hours time rage, the pt could also get a heating pad to reduce swollen, then iron infusion restored and the pt tolerated well. Iron infusion finished around 08/12 00:20, pt tolerated well.

## 2023-08-12 NOTE — DISCHARGE INSTRUCTIONS
Discharge Instructions       PATIENT ID: Beata Cohen  MRN: [id number]   YOB: 1950    DATE OF ADMISSION: 8/6/2023   DATE OF DISCHARGE: 8/12/2023    PRIMARY CARE PROVIDER: [facility]     ATTENDING PHYSICIAN: Laura Wooten MD   DISCHARGING PROVIDER: Laura Wooten MD    To contact this individual call [phone] and ask the  to page. If unavailable ask to be transferred the Adult Hospitalist Department. DISCHARGE DIAGNOSES   Erosive gastritis   Acute blood loss anemia     CONSULTATIONS:   Gastroenterology  Hematology    PROCEDURES/SURGERIES: Procedure(s):  EGD ESOPHAGOGASTRODUODENOSCOPY  COLONOSCOPY DIAGNOSTIC    PENDING TEST RESULTS:   At the time of discharge the following test results are still pending: Biopsies taken during EGD and colonoscopy    FOLLOW UP APPOINTMENTS:   PCP in 1 week to check labs: CBC, BMP;  GI in 3-4 weeks to schedule a repeat colonoscopy; Hematology in 3-4 weeks for further work-up of anemia; Surgery in 3-4 weeks to discuss hiatal hernia;     ADDITIONAL CARE RECOMMENDATIONS:   Please take all your medications as prescribed;  Schedule follow-up appointments with your PCP, Hematology, Gastroenterology, and Surgery;     DIET: regular diet    ACTIVITY: activity as tolerated    WOUND CARE: n/a    EQUIPMENT needed: n/a      DISCHARGE MEDICATIONS:   See Medication Reconciliation Form    It is important that you take the medication exactly as they are prescribed. Keep your medication in the bottles provided by the pharmacist and keep a list of the medication names, dosages, and times to be taken in your wallet. Do not take other medications without consulting your doctor. NOTIFY YOUR PHYSICIAN FOR ANY OF THE FOLLOWING:   Fever over 101 degrees for 24 hours. Chest pain, shortness of breath, fever, chills, nausea, vomiting, diarrhea, change in mentation, falling, weakness, bleeding. Severe pain or pain not relieved by medications.   Or, any other signs or symptoms that you may have questions about.       DISPOSITION:   x Home With:   OT  PT  HH  RN       SNF/Inpatient Rehab/LTAC    Independent/assisted living    Hospice    Other:     CDMP Checked:   Yes CELINE     PROBLEM LIST Updated:  Yes IK       Signed:   Jack Carl MD  8/12/2023  12:10 PM

## 2023-08-12 NOTE — CARE COORDINATION
Transition of Care Plan:    RUR: 13%  Prior Level of Functioning: independenet  Disposition: home  Follow up appointments:  on AVS  DME needed: none  Transportation at discharge: Gerard Sebastian [phone]  [facility] Medicare/ letter given: 2nd letter 8/12/23  Caregiver Contact:   Gerard Sebastian [phone]  Discharge Caregiver contacted prior to discharge? No   Care Conference needed?  no  Barriers to discharge:  none    Patient being discharged today. Daughter will transport home. Cm met with patient and she is in agreement with discharge. 2nd Medicare letter provided and signed. No transition of care needs identified.      [id number] Goran, BSW

## 2023-08-13 LAB
EKG ATRIAL RATE: 68 BPM
EKG DIAGNOSIS: NORMAL
EKG P AXIS: 89 DEGREES
EKG P-R INTERVAL: 172 MS
EKG Q-T INTERVAL: 450 MS
EKG QRS DURATION: 124 MS
EKG QTC CALCULATION (BAZETT): 478 MS
EKG R AXIS: 18 DEGREES
EKG T AXIS: 31 DEGREES
EKG VENTRICULAR RATE: 68 BPM

## 2023-08-29 ENCOUNTER — OFFICE VISIT (OUTPATIENT)
Age: 73
End: 2023-08-29
Payer: MEDICARE

## 2023-08-29 VITALS
HEIGHT: 63 IN | HEART RATE: 68 BPM | RESPIRATION RATE: 20 BRPM | TEMPERATURE: 98.6 F | DIASTOLIC BLOOD PRESSURE: 64 MMHG | WEIGHT: 104 LBS | BODY MASS INDEX: 18.43 KG/M2 | SYSTOLIC BLOOD PRESSURE: 118 MMHG

## 2023-08-29 DIAGNOSIS — K44.9 PARAESOPHAGEAL HERNIA: Primary | ICD-10-CM

## 2023-08-29 DIAGNOSIS — K21.9 GASTROESOPHAGEAL REFLUX DISEASE WITHOUT ESOPHAGITIS: ICD-10-CM

## 2023-08-29 DIAGNOSIS — D64.9 SYMPTOMATIC ANEMIA: ICD-10-CM

## 2023-08-29 DIAGNOSIS — K25.3 CAMERON LESION, ACUTE: ICD-10-CM

## 2023-08-29 PROCEDURE — 3017F COLORECTAL CA SCREEN DOC REV: CPT | Performed by: SURGERY

## 2023-08-29 PROCEDURE — 1123F ACP DISCUSS/DSCN MKR DOCD: CPT | Performed by: SURGERY

## 2023-08-29 PROCEDURE — G8400 PT W/DXA NO RESULTS DOC: HCPCS | Performed by: SURGERY

## 2023-08-29 PROCEDURE — G8419 CALC BMI OUT NRM PARAM NOF/U: HCPCS | Performed by: SURGERY

## 2023-08-29 PROCEDURE — 1090F PRES/ABSN URINE INCON ASSESS: CPT | Performed by: SURGERY

## 2023-08-29 PROCEDURE — 99203 OFFICE O/P NEW LOW 30 MIN: CPT | Performed by: SURGERY

## 2023-08-29 PROCEDURE — 4004F PT TOBACCO SCREEN RCVD TLK: CPT | Performed by: SURGERY

## 2023-08-29 PROCEDURE — 1111F DSCHRG MED/CURRENT MED MERGE: CPT | Performed by: SURGERY

## 2023-08-29 PROCEDURE — G8427 DOCREV CUR MEDS BY ELIG CLIN: HCPCS | Performed by: SURGERY

## 2023-08-29 RX ORDER — ROSUVASTATIN CALCIUM 10 MG/1
10 TABLET, COATED ORAL DAILY
COMMUNITY

## 2023-08-29 ASSESSMENT — PATIENT HEALTH QUESTIONNAIRE - PHQ9
SUM OF ALL RESPONSES TO PHQ QUESTIONS 1-9: 0
2. FEELING DOWN, DEPRESSED OR HOPELESS: 0
SUM OF ALL RESPONSES TO PHQ QUESTIONS 1-9: 0
SUM OF ALL RESPONSES TO PHQ QUESTIONS 1-9: 0
1. LITTLE INTEREST OR PLEASURE IN DOING THINGS: 0
SUM OF ALL RESPONSES TO PHQ9 QUESTIONS 1 & 2: 0
SUM OF ALL RESPONSES TO PHQ QUESTIONS 1-9: 0

## 2023-08-31 PROBLEM — K25.3 CAMERON LESION, ACUTE: Status: ACTIVE | Noted: 2023-08-31

## 2023-08-31 PROBLEM — K44.9 PARAESOPHAGEAL HERNIA: Status: ACTIVE | Noted: 2023-08-31

## 2023-08-31 NOTE — PROGRESS NOTES
Subjective:      Roger Hathaway is a 74 y.o. female who is for evaluation paraesophageal hernia. She was recently admitted with symptomatic anemia. Work-up revealed large paraesophageal hernia with Thomasenia Mimes lesions (not bleeding at time of EGD); PPI, Iron initiated, and she was discharged to home. She notes gradual improvement in energy levels and endurance. She notes mild GERD symptoms and early satiety; she denies melena, hematochezia, aspiration, voice changes, or regurgitation. Past Medical History:   Diagnosis Date    Chronic kidney disease     Diabetes ([address])     Hypertension      Patient Active Problem List    Diagnosis Date Noted    Paraesophageal hernia 08/31/2023    Julian lesion, acute 08/31/2023    Symptomatic anemia 08/07/2023     Past Surgical History:   Procedure Laterality Date    COLONOSCOPY N/A 8/10/2023    COLONOSCOPY DIAGNOSTIC performed by Frankie Chirinos MD at [address] [facility] N/A 8/10/2023    EGD ESOPHAGOGASTRODUODENOSCOPY performed by Frankie Chirinos MD at [facility] ENDOSCOPY     No family history on file. Social History     Socioeconomic History    Marital status:      Spouse name: None    Number of children: None    Years of education: None    Highest education level: None   Tobacco Use    Smoking status: Every Day     Types: Cigarettes    Smokeless tobacco: Never     Current Outpatient Medications on File Prior to Visit   Medication Sig Dispense Refill    rosuvastatin (CRESTOR) 10 MG tablet Take 1 tablet by mouth daily      pantoprazole (PROTONIX) 40 MG tablet Take 1 tablet by mouth in the morning and 1 tablet in the evening.  60 tablet 0    calcium elemental (OSCAL) 500 MG TABS tablet Take 1 tablet by mouth every evening 30 tablet 0    metFORMIN (GLUCOPHAGE) 1000 MG tablet Take 1 tablet by mouth 2 times daily (with meals)      olmesartan-hydroCHLOROthiazide (BENICAR HCT) 20-12.5 MG per tablet Take 1 tablet by mouth daily       No current

## 2023-12-05 ENCOUNTER — HOSPITAL ENCOUNTER (EMERGENCY)
Facility: HOSPITAL | Age: 73
Discharge: HOME | End: 2023-12-05
Attending: EMERGENCY MEDICINE
Payer: MEDICARE

## 2023-12-05 VITALS
HEIGHT: 63 IN | RESPIRATION RATE: 16 BRPM | HEART RATE: 73 BPM | WEIGHT: 105 LBS | SYSTOLIC BLOOD PRESSURE: 125 MMHG | BODY MASS INDEX: 18.61 KG/M2 | TEMPERATURE: 98.3 F | DIASTOLIC BLOOD PRESSURE: 67 MMHG | OXYGEN SATURATION: 94 %

## 2023-12-05 DIAGNOSIS — E86.0 DEHYDRATION: ICD-10-CM

## 2023-12-05 DIAGNOSIS — E16.2 HYPOGLYCEMIA: Primary | ICD-10-CM

## 2023-12-05 LAB
ALBUMIN SERPL-MCNC: 3.6 G/DL (ref 3.5–5)
ALBUMIN/GLOB SERPL: 1 (ref 1.1–2.2)
ALP SERPL-CCNC: 56 U/L (ref 45–117)
ALT SERPL-CCNC: 22 U/L (ref 12–78)
ANION GAP SERPL CALC-SCNC: 8 MMOL/L (ref 5–15)
AST SERPL-CCNC: 17 U/L (ref 15–37)
BASOPHILS # BLD: 0 K/UL (ref 0–0.1)
BASOPHILS NFR BLD: 0 % (ref 0–1)
BILIRUB SERPL-MCNC: 0.4 MG/DL (ref 0.2–1)
BUN SERPL-MCNC: 31 MG/DL (ref 6–20)
BUN/CREAT SERPL: 27 (ref 12–20)
CALCIUM SERPL-MCNC: 9.8 MG/DL (ref 8.5–10.1)
CHLORIDE SERPL-SCNC: 105 MMOL/L (ref 97–108)
CO2 SERPL-SCNC: 29 MMOL/L (ref 21–32)
CREAT SERPL-MCNC: 1.15 MG/DL (ref 0.55–1.02)
DEPRECATED S PYO AG THROAT QL EIA: NEGATIVE
DIFFERENTIAL METHOD BLD: NORMAL
EOSINOPHIL # BLD: 0.1 K/UL (ref 0–0.4)
EOSINOPHIL NFR BLD: 1 % (ref 0–7)
ERYTHROCYTE [DISTWIDTH] IN BLOOD BY AUTOMATED COUNT: 14.4 % (ref 11.5–14.5)
FLUAV AG NPH QL IA: NEGATIVE
FLUBV AG NOSE QL IA: NEGATIVE
GLOBULIN SER CALC-MCNC: 3.7 G/DL (ref 2–4)
GLUCOSE BLD STRIP.AUTO-MCNC: 104 MG/DL (ref 65–117)
GLUCOSE SERPL-MCNC: 105 MG/DL (ref 65–100)
HCT VFR BLD AUTO: 38.7 % (ref 35–47)
HGB BLD-MCNC: 12.5 G/DL (ref 11.5–16)
IMM GRANULOCYTES # BLD AUTO: 0 K/UL (ref 0–0.04)
IMM GRANULOCYTES NFR BLD AUTO: 0 % (ref 0–0.5)
LYMPHOCYTES # BLD: 1.7 K/UL (ref 0.8–3.5)
LYMPHOCYTES NFR BLD: 23 % (ref 12–49)
MCH RBC QN AUTO: 29.4 PG (ref 26–34)
MCHC RBC AUTO-ENTMCNC: 32.3 G/DL (ref 30–36.5)
MCV RBC AUTO: 91.1 FL (ref 80–99)
MONOCYTES # BLD: 0.6 K/UL (ref 0–1)
MONOCYTES NFR BLD: 9 % (ref 5–13)
NEUTS SEG # BLD: 4.8 K/UL (ref 1.8–8)
NEUTS SEG NFR BLD: 67 % (ref 32–75)
NRBC # BLD: 0 K/UL (ref 0–0.01)
NRBC BLD-RTO: 0 PER 100 WBC
PLATELET # BLD AUTO: 289 K/UL (ref 150–400)
PMV BLD AUTO: 10 FL (ref 8.9–12.9)
POTASSIUM SERPL-SCNC: 3.9 MMOL/L (ref 3.5–5.1)
PROT SERPL-MCNC: 7.3 G/DL (ref 6.4–8.2)
RBC # BLD AUTO: 4.25 M/UL (ref 3.8–5.2)
SERVICE CMNT-IMP: NORMAL
SODIUM SERPL-SCNC: 142 MMOL/L (ref 136–145)
WBC # BLD AUTO: 7.2 K/UL (ref 3.6–11)

## 2023-12-05 PROCEDURE — 87070 CULTURE OTHR SPECIMN AEROBIC: CPT

## 2023-12-05 PROCEDURE — 36415 COLL VENOUS BLD VENIPUNCTURE: CPT

## 2023-12-05 PROCEDURE — 99284 EMERGENCY DEPT VISIT MOD MDM: CPT

## 2023-12-05 PROCEDURE — 80053 COMPREHEN METABOLIC PANEL: CPT

## 2023-12-05 PROCEDURE — 82962 GLUCOSE BLOOD TEST: CPT

## 2023-12-05 PROCEDURE — 85025 COMPLETE CBC W/AUTO DIFF WBC: CPT

## 2023-12-05 PROCEDURE — 96360 HYDRATION IV INFUSION INIT: CPT

## 2023-12-05 PROCEDURE — 2580000003 HC RX 258: Performed by: EMERGENCY MEDICINE

## 2023-12-05 PROCEDURE — 87804 INFLUENZA ASSAY W/OPTIC: CPT

## 2023-12-05 PROCEDURE — 87880 STREP A ASSAY W/OPTIC: CPT

## 2023-12-05 RX ORDER — LANOLIN ALCOHOL/MO/W.PET/CERES
4000 CREAM (GRAM) TOPICAL DAILY
COMMUNITY

## 2023-12-05 RX ORDER — MULTIVITAMIN
1 CAPSULE ORAL DAILY
COMMUNITY

## 2023-12-05 RX ORDER — 0.9 % SODIUM CHLORIDE 0.9 %
500 INTRAVENOUS SOLUTION INTRAVENOUS ONCE
Status: COMPLETED | OUTPATIENT
Start: 2023-12-05 | End: 2023-12-05

## 2023-12-05 RX ADMIN — SODIUM CHLORIDE 500 ML: 9 INJECTION, SOLUTION INTRAVENOUS at 21:05

## 2023-12-05 ASSESSMENT — PAIN - FUNCTIONAL ASSESSMENT: PAIN_FUNCTIONAL_ASSESSMENT: NONE - DENIES PAIN

## 2023-12-06 NOTE — ED TRIAGE NOTES
Reports 2 weeks ago developed low BG in the morning, usually will be able to fix with food. Today since 1630 unable to keep to keep her BG up even with after eating. Prior to ED had orange juice and BG was 108. The lowest BG was 80. Reports sore throat, sneezing.  in Triage.

## 2023-12-06 NOTE — ED PROVIDER NOTES
[facility]  EMERGENCY DEPARTMENT ENCOUNTER      Pt Name: Leana Wright  MRN: [id number]  [address] [facility] [place] 1950  Date of evaluation: 12/5/2023  Provider: Corie Alvarez MD      HISTORY OF PRESENT ILLNESS      49-year-old female history of CKD, diabetes, hypertension presents to the emergency department chief complaint of dropping blood sugars. She reports for the last several weeks, potentially last months she has had episodes of low blood sugar in the middle of the morning and middle of the afternoon which has been more resistant to eating than previously. She reports she lost about 5 pounds but thinks that she gained it back again. She reports also feeling sick with sore throat, cough, sneezing for about 1 week without fevers. She has been on metformin 1000 mg twice a day for a long time without ill effects. She called [facility] this morning when her blood sugar meter would not read and she was advised to come to the emergency department. The history is provided by the patient and medical records. Nursing Notes were reviewed.     REVIEW OF SYSTEMS         Review of Systems        PAST MEDICAL HISTORY     Past Medical History:   Diagnosis Date    Chronic kidney disease     Diabetes ([address] [facility])     Hypertension          SURGICAL HISTORY       Past Surgical History:   Procedure Laterality Date    COLONOSCOPY N/A 8/10/2023    COLONOSCOPY DIAGNOSTIC performed by Los Marroquin MD at [address] [facility] N/A 8/10/2023    EGD ESOPHAGOGASTRODUODENOSCOPY performed by Los Marroquin MD at [facility]       Previous Medications    CALCIUM ELEMENTAL (OSCAL) 500 MG TABS TABLET    Take 1 tablet by mouth every evening    METFORMIN (GLUCOPHAGE) 1000 MG TABLET    Take 1 tablet by mouth 2 times daily (with meals)    MULTIPLE VITAMIN (MULTIVITAMIN) CAPSULE    Take 1 capsule by mouth daily    OLMESARTAN-HYDROCHLOROTHIAZIDE (BENICAR HCT)

## 2023-12-06 NOTE — DISCHARGE INSTRUCTIONS
We would like you to change your metformin dosing from 1000 mg twice a day to 500 mg twice a day. You are somewhat dehydrated today, this can result in the buildup of the medicine in your system and result in low blood sugars. We recommend continuing 500 mg of metformin twice a day until follow-up with your primary care doctor.

## 2023-12-06 NOTE — ED NOTES
Patient refused to remain in ED to receive remainder of IV fluids. This writer educated patient, discharge instructions given to patient and family member at bedside.       Cayden Melton RN  12/05/23 [id number]

## 2023-12-08 LAB
BACTERIA SPEC CULT: NORMAL
SERVICE CMNT-IMP: NORMAL

## 2024-07-22 PROBLEM — E11.9 TYPE 2 DIABETES MELLITUS (HCC): Status: ACTIVE | Noted: 2024-07-22

## 2024-09-09 ENCOUNTER — CARE COORDINATION (OUTPATIENT)
Dept: OTHER | Facility: CLINIC | Age: 74
End: 2024-09-09

## (undated) DEVICE — FORCEPS BX OVL CUP SERR DISP CAP L 240CM RAD JAW 4